# Patient Record
Sex: MALE | Race: WHITE | NOT HISPANIC OR LATINO | Employment: OTHER | URBAN - METROPOLITAN AREA
[De-identification: names, ages, dates, MRNs, and addresses within clinical notes are randomized per-mention and may not be internally consistent; named-entity substitution may affect disease eponyms.]

---

## 2023-12-02 ENCOUNTER — APPOINTMENT (OUTPATIENT)
Dept: RADIOLOGY | Facility: MEDICAL CENTER | Age: 48
DRG: 322 | End: 2023-12-02
Attending: EMERGENCY MEDICINE
Payer: MEDICARE

## 2023-12-02 ENCOUNTER — APPOINTMENT (OUTPATIENT)
Dept: CARDIOLOGY | Facility: MEDICAL CENTER | Age: 48
DRG: 322 | End: 2023-12-02
Attending: NURSE PRACTITIONER
Payer: MEDICARE

## 2023-12-02 ENCOUNTER — TELEPHONE (OUTPATIENT)
Dept: CARDIOLOGY | Facility: MEDICAL CENTER | Age: 48
End: 2023-12-02

## 2023-12-02 ENCOUNTER — HOSPITAL ENCOUNTER (INPATIENT)
Facility: MEDICAL CENTER | Age: 48
LOS: 1 days | DRG: 322 | End: 2023-12-03
Attending: EMERGENCY MEDICINE | Admitting: HOSPITALIST
Payer: MEDICARE

## 2023-12-02 DIAGNOSIS — E11.9 TYPE 2 DIABETES MELLITUS WITHOUT COMPLICATION, WITHOUT LONG-TERM CURRENT USE OF INSULIN (HCC): ICD-10-CM

## 2023-12-02 DIAGNOSIS — I24.9 ACUTE CORONARY SYNDROME (HCC): ICD-10-CM

## 2023-12-02 DIAGNOSIS — I24.9 ACUTE CORONARY SYNDROMES (HCC): ICD-10-CM

## 2023-12-02 PROBLEM — I10 PRIMARY HYPERTENSION: Status: ACTIVE | Noted: 2023-12-02

## 2023-12-02 PROBLEM — E78.2 MIXED HYPERLIPIDEMIA: Status: ACTIVE | Noted: 2023-12-02

## 2023-12-02 PROBLEM — G47.33 OBSTRUCTIVE SLEEP APNEA: Status: ACTIVE | Noted: 2023-12-02

## 2023-12-02 LAB
ACT BLD: 212 SEC (ref 74–137)
ACT BLD: 255 SEC (ref 74–137)
ALBUMIN SERPL BCP-MCNC: 4.1 G/DL (ref 3.2–4.9)
ALBUMIN/GLOB SERPL: 1.5 G/DL
ALP SERPL-CCNC: 58 U/L (ref 30–99)
ALT SERPL-CCNC: 21 U/L (ref 2–50)
ANION GAP SERPL CALC-SCNC: 11 MMOL/L (ref 7–16)
APTT PPP: 31.3 SEC (ref 24.7–36)
AST SERPL-CCNC: 38 U/L (ref 12–45)
BASOPHILS # BLD AUTO: 1.1 % (ref 0–1.8)
BASOPHILS # BLD: 0.09 K/UL (ref 0–0.12)
BILIRUB SERPL-MCNC: 0.5 MG/DL (ref 0.1–1.5)
BUN SERPL-MCNC: 12 MG/DL (ref 8–22)
CALCIUM ALBUM COR SERPL-MCNC: 8.5 MG/DL (ref 8.5–10.5)
CALCIUM SERPL-MCNC: 8.6 MG/DL (ref 8.5–10.5)
CHLORIDE SERPL-SCNC: 102 MMOL/L (ref 96–112)
CO2 SERPL-SCNC: 21 MMOL/L (ref 20–33)
CREAT SERPL-MCNC: 0.71 MG/DL (ref 0.5–1.4)
EKG IMPRESSION: NORMAL
EOSINOPHIL # BLD AUTO: 0.24 K/UL (ref 0–0.51)
EOSINOPHIL NFR BLD: 3 % (ref 0–6.9)
ERYTHROCYTE [DISTWIDTH] IN BLOOD BY AUTOMATED COUNT: 40.7 FL (ref 35.9–50)
EST. AVERAGE GLUCOSE BLD GHB EST-MCNC: 255 MG/DL
GFR SERPLBLD CREATININE-BSD FMLA CKD-EPI: 112 ML/MIN/1.73 M 2
GLOBULIN SER CALC-MCNC: 2.8 G/DL (ref 1.9–3.5)
GLUCOSE BLD STRIP.AUTO-MCNC: 271 MG/DL (ref 65–99)
GLUCOSE BLD STRIP.AUTO-MCNC: 294 MG/DL (ref 65–99)
GLUCOSE SERPL-MCNC: 313 MG/DL (ref 65–99)
HBA1C MFR BLD: 10.5 % (ref 4–5.6)
HCT VFR BLD AUTO: 41.9 % (ref 42–52)
HGB BLD-MCNC: 14.9 G/DL (ref 14–18)
IMM GRANULOCYTES # BLD AUTO: 0.02 K/UL (ref 0–0.11)
IMM GRANULOCYTES NFR BLD AUTO: 0.3 % (ref 0–0.9)
INR PPP: 1.07 (ref 0.87–1.13)
LYMPHOCYTES # BLD AUTO: 2.81 K/UL (ref 1–4.8)
LYMPHOCYTES NFR BLD: 35.4 % (ref 22–41)
MCH RBC QN AUTO: 31.4 PG (ref 27–33)
MCHC RBC AUTO-ENTMCNC: 35.6 G/DL (ref 32.3–36.5)
MCV RBC AUTO: 88.2 FL (ref 81.4–97.8)
MONOCYTES # BLD AUTO: 0.45 K/UL (ref 0–0.85)
MONOCYTES NFR BLD AUTO: 5.7 % (ref 0–13.4)
NEUTROPHILS # BLD AUTO: 4.32 K/UL (ref 1.82–7.42)
NEUTROPHILS NFR BLD: 54.5 % (ref 44–72)
NRBC # BLD AUTO: 0 K/UL
NRBC BLD-RTO: 0 /100 WBC (ref 0–0.2)
PLATELET # BLD AUTO: 231 K/UL (ref 164–446)
PMV BLD AUTO: 10.8 FL (ref 9–12.9)
POTASSIUM SERPL-SCNC: 4 MMOL/L (ref 3.6–5.5)
PROT SERPL-MCNC: 6.9 G/DL (ref 6–8.2)
PROTHROMBIN TIME: 14 SEC (ref 12–14.6)
RBC # BLD AUTO: 4.75 M/UL (ref 4.7–6.1)
SODIUM SERPL-SCNC: 134 MMOL/L (ref 135–145)
TROPONIN T SERPL-MCNC: 198 NG/L (ref 6–19)
TROPONIN T SERPL-MCNC: 397 NG/L (ref 6–19)
TROPONIN T SERPL-MCNC: 7725 NG/L (ref 6–19)
UFH PPP CHRO-ACNC: <0.1 IU/ML
WBC # BLD AUTO: 7.9 K/UL (ref 4.8–10.8)

## 2023-12-02 PROCEDURE — 99152 MOD SED SAME PHYS/QHP 5/>YRS: CPT | Performed by: INTERNAL MEDICINE

## 2023-12-02 PROCEDURE — 700105 HCHG RX REV CODE 258: Performed by: NURSE PRACTITIONER

## 2023-12-02 PROCEDURE — 99291 CRITICAL CARE FIRST HOUR: CPT | Mod: AI | Performed by: HOSPITALIST

## 2023-12-02 PROCEDURE — 93010 ELECTROCARDIOGRAM REPORT: CPT | Mod: 59,76 | Performed by: INTERNAL MEDICINE

## 2023-12-02 PROCEDURE — 85520 HEPARIN ASSAY: CPT

## 2023-12-02 PROCEDURE — 4A023N7 MEASUREMENT OF CARDIAC SAMPLING AND PRESSURE, LEFT HEART, PERCUTANEOUS APPROACH: ICD-10-PCS | Performed by: INTERNAL MEDICINE

## 2023-12-02 PROCEDURE — 027034Z DILATION OF CORONARY ARTERY, ONE ARTERY WITH DRUG-ELUTING INTRALUMINAL DEVICE, PERCUTANEOUS APPROACH: ICD-10-PCS | Performed by: INTERNAL MEDICINE

## 2023-12-02 PROCEDURE — B240ZZ3 ULTRASONOGRAPHY OF SINGLE CORONARY ARTERY, INTRAVASCULAR: ICD-10-PCS | Performed by: INTERNAL MEDICINE

## 2023-12-02 PROCEDURE — 770020 HCHG ROOM/CARE - TELE (206)

## 2023-12-02 PROCEDURE — 93306 TTE W/DOPPLER COMPLETE: CPT | Mod: 26 | Performed by: INTERNAL MEDICINE

## 2023-12-02 PROCEDURE — 84484 ASSAY OF TROPONIN QUANT: CPT

## 2023-12-02 PROCEDURE — 93010 ELECTROCARDIOGRAM REPORT: CPT | Mod: 59 | Performed by: INTERNAL MEDICINE

## 2023-12-02 PROCEDURE — 92978 ENDOLUMINL IVUS OCT C 1ST: CPT | Mod: 26,LC | Performed by: INTERNAL MEDICINE

## 2023-12-02 PROCEDURE — 80053 COMPREHEN METABOLIC PANEL: CPT

## 2023-12-02 PROCEDURE — 36415 COLL VENOUS BLD VENIPUNCTURE: CPT

## 2023-12-02 PROCEDURE — 700101 HCHG RX REV CODE 250

## 2023-12-02 PROCEDURE — A9270 NON-COVERED ITEM OR SERVICE: HCPCS | Performed by: HOSPITALIST

## 2023-12-02 PROCEDURE — 700101 HCHG RX REV CODE 250: Performed by: EMERGENCY MEDICINE

## 2023-12-02 PROCEDURE — B2111ZZ FLUOROSCOPY OF MULTIPLE CORONARY ARTERIES USING LOW OSMOLAR CONTRAST: ICD-10-PCS | Performed by: INTERNAL MEDICINE

## 2023-12-02 PROCEDURE — 96366 THER/PROPH/DIAG IV INF ADDON: CPT

## 2023-12-02 PROCEDURE — 92928 PRQ TCAT PLMT NTRAC ST 1 LES: CPT | Mod: LC | Performed by: INTERNAL MEDICINE

## 2023-12-02 PROCEDURE — 85347 COAGULATION TIME ACTIVATED: CPT

## 2023-12-02 PROCEDURE — A9270 NON-COVERED ITEM OR SERVICE: HCPCS

## 2023-12-02 PROCEDURE — 93306 TTE W/DOPPLER COMPLETE: CPT

## 2023-12-02 PROCEDURE — 99153 MOD SED SAME PHYS/QHP EA: CPT

## 2023-12-02 PROCEDURE — 93005 ELECTROCARDIOGRAM TRACING: CPT | Performed by: INTERNAL MEDICINE

## 2023-12-02 PROCEDURE — 85025 COMPLETE CBC W/AUTO DIFF WBC: CPT

## 2023-12-02 PROCEDURE — 700102 HCHG RX REV CODE 250 W/ 637 OVERRIDE(OP): Performed by: HOSPITALIST

## 2023-12-02 PROCEDURE — 93005 ELECTROCARDIOGRAM TRACING: CPT | Performed by: EMERGENCY MEDICINE

## 2023-12-02 PROCEDURE — 700117 HCHG RX CONTRAST REV CODE 255: Performed by: INTERNAL MEDICINE

## 2023-12-02 PROCEDURE — 96375 TX/PRO/DX INJ NEW DRUG ADDON: CPT

## 2023-12-02 PROCEDURE — 85610 PROTHROMBIN TIME: CPT

## 2023-12-02 PROCEDURE — 700111 HCHG RX REV CODE 636 W/ 250 OVERRIDE (IP): Performed by: EMERGENCY MEDICINE

## 2023-12-02 PROCEDURE — 700105 HCHG RX REV CODE 258: Performed by: INTERNAL MEDICINE

## 2023-12-02 PROCEDURE — 93005 ELECTROCARDIOGRAM TRACING: CPT | Performed by: HOSPITALIST

## 2023-12-02 PROCEDURE — 700111 HCHG RX REV CODE 636 W/ 250 OVERRIDE (IP)

## 2023-12-02 PROCEDURE — 99285 EMERGENCY DEPT VISIT HI MDM: CPT

## 2023-12-02 PROCEDURE — 700111 HCHG RX REV CODE 636 W/ 250 OVERRIDE (IP): Mod: JZ | Performed by: HOSPITALIST

## 2023-12-02 PROCEDURE — 700102 HCHG RX REV CODE 250 W/ 637 OVERRIDE(OP)

## 2023-12-02 PROCEDURE — 82962 GLUCOSE BLOOD TEST: CPT | Mod: 91

## 2023-12-02 PROCEDURE — 96365 THER/PROPH/DIAG IV INF INIT: CPT

## 2023-12-02 PROCEDURE — 99223 1ST HOSP IP/OBS HIGH 75: CPT | Mod: 25 | Performed by: INTERNAL MEDICINE

## 2023-12-02 PROCEDURE — 85730 THROMBOPLASTIN TIME PARTIAL: CPT

## 2023-12-02 PROCEDURE — B2151ZZ FLUOROSCOPY OF LEFT HEART USING LOW OSMOLAR CONTRAST: ICD-10-PCS | Performed by: INTERNAL MEDICINE

## 2023-12-02 PROCEDURE — 71045 X-RAY EXAM CHEST 1 VIEW: CPT

## 2023-12-02 PROCEDURE — 83036 HEMOGLOBIN GLYCOSYLATED A1C: CPT

## 2023-12-02 PROCEDURE — 93458 L HRT ARTERY/VENTRICLE ANGIO: CPT | Mod: 26,59 | Performed by: INTERNAL MEDICINE

## 2023-12-02 RX ORDER — HYDROCODONE BITARTRATE AND ACETAMINOPHEN 10; 325 MG/1; MG/1
1 TABLET ORAL 4 TIMES DAILY
COMMUNITY
End: 2024-02-07

## 2023-12-02 RX ORDER — PRASUGREL 10 MG/1
10 TABLET, FILM COATED ORAL DAILY
Status: DISCONTINUED | OUTPATIENT
Start: 2023-12-03 | End: 2023-12-03 | Stop reason: HOSPADM

## 2023-12-02 RX ORDER — VERAPAMIL HYDROCHLORIDE 2.5 MG/ML
INJECTION, SOLUTION INTRAVENOUS
Status: COMPLETED
Start: 2023-12-02 | End: 2023-12-02

## 2023-12-02 RX ORDER — LIDOCAINE HYDROCHLORIDE 20 MG/ML
INJECTION, SOLUTION INFILTRATION; PERINEURAL
Status: COMPLETED
Start: 2023-12-02 | End: 2023-12-02

## 2023-12-02 RX ORDER — MORPHINE SULFATE 4 MG/ML
2-4 INJECTION INTRAVENOUS
Status: DISCONTINUED | OUTPATIENT
Start: 2023-12-02 | End: 2023-12-03 | Stop reason: HOSPADM

## 2023-12-02 RX ORDER — HEPARIN SODIUM 5000 [USP'U]/100ML
0-30 INJECTION, SOLUTION INTRAVENOUS CONTINUOUS
Status: DISCONTINUED | OUTPATIENT
Start: 2023-12-02 | End: 2023-12-02

## 2023-12-02 RX ORDER — LISINOPRIL 5 MG/1
2.5 TABLET ORAL
Status: DISCONTINUED | OUTPATIENT
Start: 2023-12-02 | End: 2023-12-03 | Stop reason: HOSPADM

## 2023-12-02 RX ORDER — IBUPROFEN 200 MG
800-1000 TABLET ORAL EVERY 8 HOURS PRN
Status: ON HOLD | COMMUNITY
End: 2023-12-03

## 2023-12-02 RX ORDER — HEPARIN SODIUM 1000 [USP'U]/ML
INJECTION, SOLUTION INTRAVENOUS; SUBCUTANEOUS
Status: COMPLETED
Start: 2023-12-02 | End: 2023-12-02

## 2023-12-02 RX ORDER — ASPIRIN 81 MG/1
81 TABLET ORAL DAILY
Status: DISCONTINUED | OUTPATIENT
Start: 2023-12-03 | End: 2023-12-03 | Stop reason: HOSPADM

## 2023-12-02 RX ORDER — ONDANSETRON 4 MG/1
4 TABLET, ORALLY DISINTEGRATING ORAL EVERY 4 HOURS PRN
Status: DISCONTINUED | OUTPATIENT
Start: 2023-12-02 | End: 2023-12-03 | Stop reason: HOSPADM

## 2023-12-02 RX ORDER — CELECOXIB 200 MG/1
200 CAPSULE ORAL 2 TIMES DAILY
Status: ON HOLD | COMMUNITY
End: 2023-12-03

## 2023-12-02 RX ORDER — PRASUGREL 10 MG/1
60 TABLET, FILM COATED ORAL ONCE
Status: DISCONTINUED | OUTPATIENT
Start: 2023-12-02 | End: 2023-12-02

## 2023-12-02 RX ORDER — METOPROLOL TARTRATE 1 MG/ML
5 INJECTION, SOLUTION INTRAVENOUS ONCE
Status: COMPLETED | OUTPATIENT
Start: 2023-12-02 | End: 2023-12-02

## 2023-12-02 RX ORDER — NITROGLYCERIN 0.4 MG/1
0.4 TABLET SUBLINGUAL
Status: DISCONTINUED | OUTPATIENT
Start: 2023-12-02 | End: 2023-12-03 | Stop reason: HOSPADM

## 2023-12-02 RX ORDER — ONDANSETRON 2 MG/ML
4 INJECTION INTRAMUSCULAR; INTRAVENOUS EVERY 4 HOURS PRN
Status: DISCONTINUED | OUTPATIENT
Start: 2023-12-02 | End: 2023-12-03 | Stop reason: HOSPADM

## 2023-12-02 RX ORDER — ASPIRIN 100 %
1 POWDER (GRAM) MISCELLANEOUS ONCE
Status: ON HOLD | COMMUNITY
End: 2023-12-03

## 2023-12-02 RX ORDER — BISACODYL 10 MG
10 SUPPOSITORY, RECTAL RECTAL
Status: DISCONTINUED | OUTPATIENT
Start: 2023-12-02 | End: 2023-12-03 | Stop reason: HOSPADM

## 2023-12-02 RX ORDER — POLYETHYLENE GLYCOL 3350 17 G/17G
1 POWDER, FOR SOLUTION ORAL
Status: DISCONTINUED | OUTPATIENT
Start: 2023-12-02 | End: 2023-12-03 | Stop reason: HOSPADM

## 2023-12-02 RX ORDER — HEPARIN SODIUM 200 [USP'U]/100ML
INJECTION, SOLUTION INTRAVENOUS
Status: COMPLETED
Start: 2023-12-02 | End: 2023-12-02

## 2023-12-02 RX ORDER — PRASUGREL 10 MG/1
TABLET, FILM COATED ORAL
Status: COMPLETED
Start: 2023-12-02 | End: 2023-12-02

## 2023-12-02 RX ORDER — HEPARIN SODIUM 1000 [USP'U]/ML
2000 INJECTION, SOLUTION INTRAVENOUS; SUBCUTANEOUS PRN
Status: DISCONTINUED | OUTPATIENT
Start: 2023-12-02 | End: 2023-12-02

## 2023-12-02 RX ORDER — ACETAMINOPHEN 325 MG/1
650 TABLET ORAL EVERY 6 HOURS PRN
Status: DISCONTINUED | OUTPATIENT
Start: 2023-12-02 | End: 2023-12-03 | Stop reason: HOSPADM

## 2023-12-02 RX ORDER — CARVEDILOL 6.25 MG/1
6.25 TABLET ORAL 2 TIMES DAILY WITH MEALS
Status: DISCONTINUED | OUTPATIENT
Start: 2023-12-02 | End: 2023-12-03 | Stop reason: HOSPADM

## 2023-12-02 RX ORDER — ATORVASTATIN CALCIUM 40 MG/1
40 TABLET, FILM COATED ORAL EVERY EVENING
Status: DISCONTINUED | OUTPATIENT
Start: 2023-12-02 | End: 2023-12-02

## 2023-12-02 RX ORDER — PROMETHAZINE HYDROCHLORIDE 25 MG/1
12.5-25 SUPPOSITORY RECTAL EVERY 4 HOURS PRN
Status: DISCONTINUED | OUTPATIENT
Start: 2023-12-02 | End: 2023-12-03 | Stop reason: HOSPADM

## 2023-12-02 RX ORDER — ATORVASTATIN CALCIUM 40 MG/1
40 TABLET, FILM COATED ORAL ONCE
Status: COMPLETED | OUTPATIENT
Start: 2023-12-02 | End: 2023-12-02

## 2023-12-02 RX ORDER — ASPIRIN 81 MG/1
162 TABLET ORAL ONCE
Status: COMPLETED | OUTPATIENT
Start: 2023-12-02 | End: 2023-12-02

## 2023-12-02 RX ORDER — AMOXICILLIN 250 MG
2 CAPSULE ORAL 2 TIMES DAILY
Status: DISCONTINUED | OUTPATIENT
Start: 2023-12-02 | End: 2023-12-03 | Stop reason: HOSPADM

## 2023-12-02 RX ORDER — PROMETHAZINE HYDROCHLORIDE 25 MG/1
12.5-25 TABLET ORAL EVERY 4 HOURS PRN
Status: DISCONTINUED | OUTPATIENT
Start: 2023-12-02 | End: 2023-12-03 | Stop reason: HOSPADM

## 2023-12-02 RX ORDER — MIDAZOLAM HYDROCHLORIDE 1 MG/ML
INJECTION INTRAMUSCULAR; INTRAVENOUS
Status: COMPLETED
Start: 2023-12-02 | End: 2023-12-02

## 2023-12-02 RX ORDER — ATORVASTATIN CALCIUM 40 MG/1
40 TABLET, FILM COATED ORAL EVERY EVENING
Status: DISCONTINUED | OUTPATIENT
Start: 2023-12-03 | End: 2023-12-02

## 2023-12-02 RX ORDER — PROCHLORPERAZINE EDISYLATE 5 MG/ML
5-10 INJECTION INTRAMUSCULAR; INTRAVENOUS EVERY 4 HOURS PRN
Status: DISCONTINUED | OUTPATIENT
Start: 2023-12-02 | End: 2023-12-03 | Stop reason: HOSPADM

## 2023-12-02 RX ORDER — SODIUM CHLORIDE 9 MG/ML
3 INJECTION, SOLUTION INTRAVENOUS CONTINUOUS
Status: ACTIVE | OUTPATIENT
Start: 2023-12-02 | End: 2023-12-02

## 2023-12-02 RX ORDER — ATORVASTATIN CALCIUM 80 MG/1
80 TABLET, FILM COATED ORAL EVERY EVENING
Status: DISCONTINUED | OUTPATIENT
Start: 2023-12-03 | End: 2023-12-03 | Stop reason: HOSPADM

## 2023-12-02 RX ORDER — SODIUM CHLORIDE 9 MG/ML
INJECTION, SOLUTION INTRAVENOUS CONTINUOUS
Status: DISCONTINUED | OUTPATIENT
Start: 2023-12-02 | End: 2023-12-02

## 2023-12-02 RX ADMIN — INSULIN HUMAN 3 UNITS: 100 INJECTION, SOLUTION PARENTERAL at 16:39

## 2023-12-02 RX ADMIN — HEPARIN SODIUM: 1000 INJECTION, SOLUTION INTRAVENOUS; SUBCUTANEOUS at 12:26

## 2023-12-02 RX ADMIN — NITROGLYCERIN 10 ML: 20 INJECTION INTRAVENOUS at 12:25

## 2023-12-02 RX ADMIN — HEPARIN SODIUM 2000 UNITS: 200 INJECTION, SOLUTION INTRAVENOUS at 12:25

## 2023-12-02 RX ADMIN — IOHEXOL 119 ML: 350 INJECTION, SOLUTION INTRAVENOUS at 13:10

## 2023-12-02 RX ADMIN — MIDAZOLAM HYDROCHLORIDE 2 MG: 1 INJECTION, SOLUTION INTRAMUSCULAR; INTRAVENOUS at 12:45

## 2023-12-02 RX ADMIN — METOPROLOL TARTRATE 5 MG: 5 INJECTION INTRAVENOUS at 07:34

## 2023-12-02 RX ADMIN — SODIUM CHLORIDE 3 ML/KG/HR: 9 INJECTION, SOLUTION INTRAVENOUS at 14:26

## 2023-12-02 RX ADMIN — FENTANYL CITRATE 100 MCG: 50 INJECTION, SOLUTION INTRAMUSCULAR; INTRAVENOUS at 12:45

## 2023-12-02 RX ADMIN — MIDAZOLAM HYDROCHLORIDE 1 MG: 1 INJECTION, SOLUTION INTRAMUSCULAR; INTRAVENOUS at 13:13

## 2023-12-02 RX ADMIN — LISINOPRIL 2.5 MG: 5 TABLET ORAL at 09:22

## 2023-12-02 RX ADMIN — HEPARIN SODIUM 12 UNITS/KG/HR: 5000 INJECTION, SOLUTION INTRAVENOUS at 06:11

## 2023-12-02 RX ADMIN — HEPARIN SODIUM: 1000 INJECTION, SOLUTION INTRAVENOUS; SUBCUTANEOUS at 13:11

## 2023-12-02 RX ADMIN — LIDOCAINE HYDROCHLORIDE: 20 INJECTION, SOLUTION INFILTRATION; PERINEURAL at 12:25

## 2023-12-02 RX ADMIN — ATORVASTATIN CALCIUM 40 MG: 40 TABLET, FILM COATED ORAL at 09:22

## 2023-12-02 RX ADMIN — SODIUM CHLORIDE: 9 INJECTION, SOLUTION INTRAVENOUS at 09:21

## 2023-12-02 RX ADMIN — PRASUGREL 60 MG: 10 TABLET, FILM COATED ORAL at 13:12

## 2023-12-02 RX ADMIN — CARVEDILOL 6.25 MG: 6.25 TABLET, FILM COATED ORAL at 09:22

## 2023-12-02 RX ADMIN — MORPHINE SULFATE 4 MG: 4 INJECTION, SOLUTION INTRAMUSCULAR; INTRAVENOUS at 15:21

## 2023-12-02 RX ADMIN — ASPIRIN 162 MG: 81 TABLET, COATED ORAL at 09:22

## 2023-12-02 RX ADMIN — CARVEDILOL 6.25 MG: 6.25 TABLET, FILM COATED ORAL at 16:45

## 2023-12-02 RX ADMIN — VERAPAMIL HYDROCHLORIDE 5 MG: 2.5 INJECTION, SOLUTION INTRAVENOUS at 12:25

## 2023-12-02 RX ADMIN — ACETAMINOPHEN 650 MG: 325 TABLET, FILM COATED ORAL at 15:21

## 2023-12-02 RX ADMIN — INSULIN HUMAN 3 UNITS: 100 INJECTION, SOLUTION PARENTERAL at 20:22

## 2023-12-02 RX ADMIN — MORPHINE SULFATE 4 MG: 4 INJECTION, SOLUTION INTRAMUSCULAR; INTRAVENOUS at 13:45

## 2023-12-02 ASSESSMENT — FIBROSIS 4 INDEX: FIB4 SCORE: 1.79

## 2023-12-02 ASSESSMENT — ENCOUNTER SYMPTOMS
CHILLS: 0
STRIDOR: 0
FEVER: 0
FOCAL WEAKNESS: 0
ABDOMINAL PAIN: 0
NERVOUS/ANXIOUS: 0
EYE REDNESS: 0
FLANK PAIN: 0
MYALGIAS: 0
VOMITING: 0
SHORTNESS OF BREATH: 0
EYE DISCHARGE: 0
BRUISES/BLEEDS EASILY: 0
COUGH: 0

## 2023-12-02 ASSESSMENT — PATIENT HEALTH QUESTIONNAIRE - PHQ9
1. LITTLE INTEREST OR PLEASURE IN DOING THINGS: NOT AT ALL
2. FEELING DOWN, DEPRESSED, IRRITABLE, OR HOPELESS: NOT AT ALL
SUM OF ALL RESPONSES TO PHQ9 QUESTIONS 1 AND 2: 0

## 2023-12-02 ASSESSMENT — LIFESTYLE VARIABLES
HAVE PEOPLE ANNOYED YOU BY CRITICIZING YOUR DRINKING: NO
TOTAL SCORE: 0
EVER FELT BAD OR GUILTY ABOUT YOUR DRINKING: NO
EVER HAD A DRINK FIRST THING IN THE MORNING TO STEADY YOUR NERVES TO GET RID OF A HANGOVER: NO
CONSUMPTION TOTAL: NEGATIVE
AVERAGE NUMBER OF DAYS PER WEEK YOU HAVE A DRINK CONTAINING ALCOHOL: 0
ON A TYPICAL DAY WHEN YOU DRINK ALCOHOL HOW MANY DRINKS DO YOU HAVE: 0
ALCOHOL_USE: NO
HOW MANY TIMES IN THE PAST YEAR HAVE YOU HAD 5 OR MORE DRINKS IN A DAY: 0
TOTAL SCORE: 0
HAVE YOU EVER FELT YOU SHOULD CUT DOWN ON YOUR DRINKING: NO
TOTAL SCORE: 0

## 2023-12-02 ASSESSMENT — COGNITIVE AND FUNCTIONAL STATUS - GENERAL
MOBILITY SCORE: 23
SUGGESTED CMS G CODE MODIFIER DAILY ACTIVITY: CH
DAILY ACTIVITIY SCORE: 24
SUGGESTED CMS G CODE MODIFIER MOBILITY: CI
CLIMB 3 TO 5 STEPS WITH RAILING: A LITTLE

## 2023-12-02 ASSESSMENT — PAIN DESCRIPTION - PAIN TYPE
TYPE: ACUTE PAIN
TYPE: ACUTE PAIN

## 2023-12-02 NOTE — THERAPY
Physical Therapy Contact Note    Patient Name: Hal Vilchis  Age:  48 y.o., Sex:  male  Medical Record #: [id number]  Today's Date: 12/2/2023    PT consult received for cardiac rehab, pending cath; will follow up post intervention when appropriate for accurate education on phase I cardiac rehab/intensity.     Marilyn CERVANTES, PT,  [phone]

## 2023-12-02 NOTE — ED NOTES
Pt transported to T8 with RN on zoll on full O2 tank with chart and all belongings in stable condition

## 2023-12-02 NOTE — PROGRESS NOTES
Cardiology Follow Up Progress Note    Date of Service  12/3/23    Attending Physician  Joshua Vázquez M.D.    Chief Complaint     NSTEMI    HPI  Hal Vilchis is a 48 y.o. male admitted 12/2/2023 from [facility] with chest pain.    PMH: Tobacco use, poorly controlled type 2 diabetes, hypertension, ERICK, mixed hyperlipidemia, BMI 34  Interim Events    No overnight cardiac events  Telemetry-SR  Underwent successful PTCA/PCI distal LCx, residual disease in RCA  No recurrent chest pain  On DAPT  Poorly controlled diabetes, A1c 10  , target less than 70  Tobacco use, discussed cessation  Requires cardiac rehab        Review of Systems  Review of Systems   Respiratory:  Negative for apnea, cough, choking, chest tightness, shortness of breath, wheezing and stridor.        Vital signs in last 24 hours  Temp:  [36.6 °C (97.8 °F)-36.8 °C (98.2 °F)] 36.7 °C (98.1 °F)  Pulse:  [71-90] 75  Resp:  [18-20] 18  BP: (133-179)/() 133/102  SpO2:  [93 %-97 %] 94 %    Physical Exam  Physical Exam  Cardiovascular:      Rate and Rhythm: Normal rate and regular rhythm.      Pulses: Normal pulses.   Pulmonary:      Effort: Pulmonary effort is normal.   Skin:     General: Skin is warm.   Neurological:      Mental Status: He is alert. Mental status is at baseline.   Psychiatric:         Mood and Affect: Mood normal.         Lab Review  Lab Results   Component Value Date/Time    WBC 7.9 12/02/2023 05:58 AM    RBC 4.75 12/02/2023 05:58 AM    HEMOGLOBIN 14.9 12/02/2023 05:58 AM    HEMATOCRIT 41.9 (L) 12/02/2023 05:58 AM    MCV 88.2 12/02/2023 05:58 AM    MCH 31.4 12/02/2023 05:58 AM    MCHC 35.6 12/02/2023 05:58 AM    MPV 10.8 12/02/2023 05:58 AM      Lab Results   Component Value Date/Time    SODIUM 134 (L) 12/02/2023 05:58 AM    POTASSIUM 4.0 12/02/2023 05:58 AM    CHLORIDE 102 12/02/2023 05:58 AM    CO2 21 12/02/2023 05:58 AM    GLUCOSE 313 (H) 12/02/2023 05:58 AM    BUN 12 12/02/2023 05:58 AM    CREATININE 0.71  "12/02/2023 05:58 AM      Lab Results   Component Value Date/Time    ASTSGOT 38 12/02/2023 05:58 AM    ALTSGPT 21 12/02/2023 05:58 AM     Lab Results   Component Value Date/Time    TROPONINT 397 (H) 12/02/2023 07:43 AM       No results for input(s): \"NTPROBNP\" in the last 72 hours.        Assessment/Plan    NSTEMI  -Status post PTCA/PCI x1 distal LCx 12/2/2023.  -Residual right posterior descending artery has focal 90% stenosis in the distal portion.  -DAPT with aspirin 81 and Effient 10 mg along with atorvastatin 80, lisinopril 2.5 and carvedilol 6.25 twice daily.  -LVEF 60%  -LVEDP 16 mmHg  -      Lifelong tobacco use  -Discussed cessation in length.    Type 2 diabetes  -A1c 10.5  -Reports intolerant to metformin with GI upset  -Currently on SSI      Hypertension  -Well-controlled on lisinopril and carvedilol      BMI 34  -Therapeutic lifestyle changes    Referral to cardiac rehab  Close follow-up in cardiology office, will arrange  Cardiology will sign off    I personally spent a total of 15 minutes which includes face-to-face time and non-face-to-face time spent on preparing to see the patient, reviewing hospital notes and tests, obtaining history from the patient, performing a medically appropriate exam, counseling and educating the patient, ordering medications/tests/procedures/referrals as clinically indicated, and documenting information in the electronic medical record.     Thank you for allowing me to participate in the care of this patient.  I will continue to follow this patient    Please contact me with any questions.    RICHARD Erazo.   Cardiologist, [facility]  [phone]        "

## 2023-12-02 NOTE — ED PROVIDER NOTES
"ED Provider Note    CHIEF COMPLAINT  Chief Complaint   Patient presents with    Chest Pain     Transfer from [place].  Elevated troponin; ST depression at outside facility.        EXTERNAL RECORDS REVIEWED  Other reviewed the patient's transfer records    HPI/ROS    OUTSIDE HISTORIAN(S):  EMS provided signout in the room at the bedside    Hal Vilchis is a 48 y.o. male who presents with chest pain.  The patient states that chest pain started yesterday evening.  He states last about 15 minutes.  He states that it was a pressure with associated difficulty with breathing.  Subsequently the pain resolved however on 9:30 PM the pain came back and was much more persistent and severe.  He did have associated nausea as well as diaphoresis and dyspnea.  He presented to [facility] and was found to have a non-ST segment elevation myocardial infarction.  The patient does abuse tobacco products and he also has hypertension, dyslipidemia, and type 2 diabetes as risk factors.  He does not have any known prior cardiac history.  The patient did receive aspirin as well as nitroglycerin at their facility and he was transferred to the emergency department at [facility] on a heparin drip.  He states he does feel better at this time.  Otherwise he has not had any pain or swelling to his lower extremities.    PAST MEDICAL HISTORY       SURGICAL HISTORY  patient denies any surgical history    FAMILY HISTORY  No family history on file.    SOCIAL HISTORY  Social History     Tobacco Use    Smoking status: Not on file    Smokeless tobacco: Not on file   Substance and Sexual Activity    Alcohol use: Not on file    Drug use: Not on file    Sexual activity: Not on file       CURRENT MEDICATIONS  Home Medications    **Home medications have not yet been reviewed for this encounter**         ALLERGIES  No Known Allergies    PHYSICAL EXAM  VITAL SIGNS: Ht 1.854 m (6' 1\")   Wt 113 kg (250 lb)   BMI 32.98 kg/m²    In " general the patient does not appear toxic    HEENT unremarkable    Pulmonary the patient's lungs are symmetrically diminished throughout    Cardiovascular S1-S2 with a regular rate and rhythm    GI abdomen soft    Skin no rashes, pallor, no jaundice    Extremities no distal edema    Neurologic examination is grossly intact    DIAGNOSTIC STUDIES  Results for orders placed or performed during the hospital encounter of 12/02/23   CBC with Differential   Result Value Ref Range    WBC 7.9 4.8 - 10.8 K/uL    RBC 4.75 4.70 - 6.10 M/uL    Hemoglobin 14.9 14.0 - 18.0 g/dL    Hematocrit 41.9 (L) 42.0 - 52.0 %    MCV 88.2 81.4 - 97.8 fL    MCH 31.4 27.0 - 33.0 pg    MCHC 35.6 32.3 - 36.5 g/dL    RDW 40.7 35.9 - 50.0 fL    Platelet Count 231 164 - 446 K/uL    MPV 10.8 9.0 - 12.9 fL    Neutrophils-Polys 54.50 44.00 - 72.00 %    Lymphocytes 35.40 22.00 - 41.00 %    Monocytes 5.70 0.00 - 13.40 %    Eosinophils 3.00 0.00 - 6.90 %    Basophils 1.10 0.00 - 1.80 %    Immature Granulocytes 0.30 0.00 - 0.90 %    Nucleated RBC 0.00 0.00 - 0.20 /100 WBC    Neutrophils (Absolute) 4.32 1.82 - 7.42 K/uL    Lymphs (Absolute) 2.81 1.00 - 4.80 K/uL    Monos (Absolute) 0.45 0.00 - 0.85 K/uL    Eos (Absolute) 0.24 0.00 - 0.51 K/uL    Baso (Absolute) 0.09 0.00 - 0.12 K/uL    Immature Granulocytes (abs) 0.02 0.00 - 0.11 K/uL    NRBC (Absolute) 0.00 K/uL   Complete Metabolic Panel (CMP)   Result Value Ref Range    Sodium 134 (L) 135 - 145 mmol/L    Potassium 4.0 3.6 - 5.5 mmol/L    Chloride 102 96 - 112 mmol/L    Co2 21 20 - 33 mmol/L    Anion Gap 11.0 7.0 - 16.0    Glucose 313 (H) 65 - 99 mg/dL    Bun 12 8 - 22 mg/dL    Creatinine 0.71 0.50 - 1.40 mg/dL    Calcium 8.6 8.5 - 10.5 mg/dL    Correct Calcium 8.5 8.5 - 10.5 mg/dL    AST(SGOT) 38 12 - 45 U/L    ALT(SGPT) 21 2 - 50 U/L    Alkaline Phosphatase 58 30 - 99 U/L    Total Bilirubin 0.5 0.1 - 1.5 mg/dL    Albumin 4.1 3.2 - 4.9 g/dL    Total Protein 6.9 6.0 - 8.2 g/dL    Globulin 2.8 1.9 - 3.5  g/dL    A-G Ratio 1.5 g/dL   Troponins NOW   Result Value Ref Range    Troponin T 198 (H) 6 - 19 ng/L   aPTT   Result Value Ref Range    APTT 31.3 24.7 - 36.0 sec   Prothrombin Time   Result Value Ref Range    PT 14.0 12.0 - 14.6 sec    INR 1.07 0.87 - 1.13   Heparin Xa (Unfractionated)   Result Value Ref Range    Heparin Xa (UFH) <0.10 IU/mL   ESTIMATED GFR   Result Value Ref Range    GFR (CKD-EPI) 112 >60 mL/min/1.73 m 2   EKG   Result Value Ref Range    Report       [facility] Emergency Dept.    Test Date:  2023  Pt Name:    SUMMER NICHOLS              Department: ER  MRN:        [id number]                      Room:        09  Gender:     Male                         Technician: [id number]  :        1975                   Requested By:ER TRIAGE PROTOCOL  Order #:    [id number]                    Reading MD: FERNIE ORTIZ MD    Measurements  Intervals                                Axis  Rate:       87                           P:          32  DE:         156                          QRS:        44  QRSD:       107                          T:          101  QT:         383  QTc:        461    Interpretive Statements  Twelve-lead EKG shows a normal sinus rhythm with a ventricular rate 87, the  patient does have ST segment elevation in lead aVF as well as V5 and V6 but  is not greater than 1 box to meet criteria for an ST segment elevation  myocardial infarction.  Otherwise normal intervals and normal QRS.  Carin cisneros Signed On 2023 06:00:45 PST by FERNIE ORTIZ MD       EKG  I have independently interpreted this EKG  Please see my interpretation above    RADIOLOGY  DX-CHEST-PORTABLE (1 VIEW)   Final Result      LEFT lower lobe opacity probably atelectasis or pneumonia            COURSE & MEDICAL DECISION MAKING    This a 48-year-old gentleman who presents the emergency department with a non-ST segment elevation myocardial infarction per [facility].  I  did review his CBC that did not show any evidence of leukocytosis nor anemia.  His coagulation parameters were reviewed as well and are normal.  From a metabolic standpoint does not have any evidence of renal or hepatic insufficiency.  The patient's troponin was elevated at 0.15.  I will repeat the troponin at this time as well as the EKG.  The patient does not have any ongoing chest pain which is reassuring.    The patient's troponin does come back elevated at 198 consistent with an acute coronary syndrome.  I did continue his heparin and as mentioned above the patient did receive aspirin as well as nitroglycerin prior to transfer.  His blood pressure is starting to increase and the patient will receive Lopressor for blood pressure control.  I was able to review his EKG from the transferring facility and the EKG at that time did show significant ST elevation that met criteria for acute ST elevation myocardial infarction.  There is ST segments have come down and the patient is resting comfortably at the time of admission.     FINAL DIAGNOSIS  Acute coronary syndrome  Critical Care time 30 minutes    Disposition  The patient will be admitted in guarded condition         Electronically signed by: Stephan Atkinson M.D., 12/2/2023 5:51 AM

## 2023-12-02 NOTE — ASSESSMENT & PLAN NOTE
Body mass index is 32.98 kg/m².  I explained that there is increase in mortality and morbidity associated with excess weight.  There is increased risk of diabetes, hypertension, heart disease, cerebrovascular accidents, sleep apnea and cancer.    I placed a referral to cardiac rehab  I encouraged the patient to lose weight by reducing caloric intake, I encouraged low-carb diet in addition to aim for 120-150 minutes of exercise a week when cleared from cardiology standpoint.

## 2023-12-02 NOTE — PROGRESS NOTES
4 Eyes Skin Assessment Completed by VINNIE Hussein and CAROLYN Pink.    Head WDL  Ears WDL  Nose WDL  Mouth WDL  Neck WDL  Breast/Chest WDL  Shoulder Blades WDL  Spine WDL  (R) Arm/Elbow/Hand Redness and Blanching scattered scabs  (L) Arm/Elbow/Hand Redness and Blanchingscattered scabs  Abdomen WDL  Groin WDL  Scrotum/Coccyx/Buttocks Redness and Blanching  (R) Leg Scab  (L) Leg Scab  (R) Heel/Foot/Toe Boggy  (L) Heel/Foot/Toe Boggy          Devices In Places Tele Box, Blood Pressure Cuff, and Pulse Ox      Interventions In Place Pillows    Possible Skin Injury No    Pictures Uploaded Into Epic N/A  Wound Consult Placed N/A  RN Wound Prevention Protocol Ordered No

## 2023-12-02 NOTE — H&P
Hospital Medicine History & Physical Note    Date of Service  12/2/2023    Primary Care Physician  Pcp Pt States None    Consultants  Cardiology, Dr Gonzalez      Code Status  Full Code    Chief Complaint  Chief Complaint   Patient presents with    Chest Pain     Transfer from [place].  Elevated troponin; ST depression at outside facility.      History of Presenting Illness  Hal Vilchis is a 48 y.o. male with a past medical history of diabetes, primary hypertension, hyperlipidemia who has stopped taking his medications recently presented 12/2/2023 with chest pain.  Pain started yesterday in the retrosternal area.  Pain lasts about 15 minutes.  It is described as having a pressure-like/heaviness. He did have nausea and diaphoresis in addition to shortness of breath.  He initially presented to [facility] and was found to have a non-ST elevation myocardial infarction before transferring to our facility.     I discussed the plan of care with emergency department physician, and the patient.    Review of Systems  Review of Systems   Constitutional:  Negative for chills and fever.   Eyes:  Negative for discharge and redness.   Respiratory:  Negative for cough, shortness of breath and stridor.    Cardiovascular:  Positive for chest pain. Negative for leg swelling.   Gastrointestinal:  Negative for abdominal pain and vomiting.   Genitourinary:  Negative for flank pain.   Musculoskeletal:  Negative for myalgias.   Skin: Negative.    Neurological:  Negative for focal weakness.   Endo/Heme/Allergies:  Does not bruise/bleed easily.   Psychiatric/Behavioral:  The patient is not nervous/anxious.      Past Medical History  Primary hypertension, diabetes, and hyperlipidemia    Surgical History   has no past surgical history on file.     Family History  family history is not on file.     Social History  Denies excessive drinking    Allergies  No Known Allergies    Medications  None     Physical Exam  Temp:  [36.5  °C (97.7 °F)-36.8 °C (98.2 °F)] 36.7 °C (98.1 °F)  Pulse:  [64-90] 64  Resp:  [16-20] 16  BP: (129-179)/() 129/82  SpO2:  [93 %-97 %] 94 %  Blood Pressure: (!) 160/94   Temperature: 36.6 °C (97.8 °F)   Pulse: 78   Respiration: 18   Pulse Oximetry: 94 %     Physical Exam  Constitutional:       General: He is not in acute distress.     Appearance: He is not ill-appearing or diaphoretic.   HENT:      Head: Atraumatic.      Right Ear: External ear normal.      Left Ear: External ear normal.      Nose: No congestion or rhinorrhea.      Mouth/Throat:      Mouth: Mucous membranes are moist.   Eyes:      General: No scleral icterus.        Right eye: No discharge.         Left eye: No discharge.      Pupils: Pupils are equal, round, and reactive to light.   Cardiovascular:      Rate and Rhythm: Normal rate and regular rhythm.   Pulmonary:      Comments: Requiring 2 L of oxygen to achieve adequate saturation.  Is able to speak in full sentences.  Abdominal:      General: There is no distension.   Musculoskeletal:      Cervical back: Neck supple. No rigidity. No muscular tenderness.      Right lower leg: No edema.      Left lower leg: No edema.   Skin:     Coloration: Skin is not jaundiced or pale.   Neurological:      Mental Status: He is alert and oriented to person, place, and time.      Coordination: Coordination normal.   Psychiatric:         Mood and Affect: Mood normal.         Behavior: Behavior normal.       Laboratory:  Recent Labs     12/02/23  0558   WBC 7.9   RBC 4.75   HEMOGLOBIN 14.9   HEMATOCRIT 41.9*   MCV 88.2   MCH 31.4   MCHC 35.6   RDW 40.7   PLATELETCT 231   MPV 10.8     Recent Labs     12/02/23  0558   SODIUM 134*   POTASSIUM 4.0   CHLORIDE 102   CO2 21   GLUCOSE 313*   BUN 12   CREATININE 0.71   CALCIUM 8.6     Recent Labs     12/02/23  0558   ALTSGPT 21   ASTSGOT 38   ALKPHOSPHAT 58   TBILIRUBIN 0.5   GLUCOSE 313*     Recent Labs     12/02/23  0558   APTT 31.3   INR 1.07     No results for  "input(s): \"NTPROBNP\" in the last 72 hours.      Recent Labs     12/02/23  0558 12/02/23  0743   TROPONINT 198* 397*     Imaging:  DX-CHEST-PORTABLE (1 VIEW)   Final Result      LEFT lower lobe opacity probably atelectasis or pneumonia      CL-LEFT HEART CATHETERIZATION WITH POSSIBLE INTERVENTION    (Results Pending)   EC-ECHOCARDIOGRAM COMPLETE W/O CONT    (Results Pending)     I personally reviewed patient EKG shows sinus rhythm with a rate of 87, there is ST segment elevation in lead II, III and aVF.  There is no ST depression.  There is T wave inversion in lead aVL.  QTc is 383.    Assessment/Plan:  Justification for Admission Status  I anticipate this patient will require at least two midnights for appropriate medical management, necessitating inpatient admission because the patient has acute coronary syndrome    Patient will need a Telemetry bed on CARDIOLOGY service .  The need is secondary as the patient has acute coronary syndrome    * Acute coronary syndromes (HCC)- (present on admission)  Assessment & Plan  EKG shows, sinus rhythm with a rate of 87, there is ST segment elevation in lead II, III and aVF.  There is no ST depression.  There is T wave inversion in lead aVL.  QTc is 383.  Initial troponin is elevated and trending up   I will place on continuous cardiac monitoring.  I will start aspirin, statin, heparin drip, carvedilol and lisinopril.  Cardiology [Dr Gonzalez] have been consulted by the emergency department physician to consider cath study.    Type 2 diabetes mellitus without complication, without long-term current use of insulin (HCC)- (present on admission)  Assessment & Plan  With hyperglycemia  I will start short acting insulin for now  I will order Accu-Checks, hypoglycemia protocol  Adjust according to blood sugars trend.     Primary hypertension- (present on admission)  Assessment & Plan  I will start carvedilol and lisinopril with hold parameters    BMI 32.0-32.9,adult- (present on " admission)  Assessment & Plan  Body mass index is 32.98 kg/m².  I explained that there is increase in mortality and morbidity associated with excess weight.  There is increased risk of diabetes, hypertension, heart disease, cerebrovascular accidents, sleep apnea and cancer.    I placed a referral to cardiac rehab  I encouraged the patient to lose weight by reducing caloric intake, I encouraged low-carb diet in addition to aim for 120-150 minutes of exercise a week when cleared from cardiology standpoint.    Mixed hyperlipidemia- (present on admission)  Assessment & Plan  Cardiac diet.  I will start atorvastatin    Obstructive sleep apnea- (present on admission)  Assessment & Plan  Continuous pulse oximetry.  Oxygen as needed.  Encourage incentive spirometer      VTE prophylaxis: SCDs/TEDs and therapeutic anticoagulation with heparin drip    Patient is critically ill.   The patient continues to have: Cardiac ischemia and rising troponin  The vital organ system that is affected is the: Cardiovascular system  If untreated there is a high chance of deterioration into: Worsening cardiac ischemia, shock, multiorgan failure, and even death  The critical care that I am providing today is: Initiating titratable heparin drip, antiplatelets, ACE inhibitor, high intensity statin and beta-blockers  The critical that has been undertaken is medically complex.   There has been no overlap in critical care time.   Critical Care Time not including procedures: 76 minutes

## 2023-12-02 NOTE — ED NOTES
Med Rec completed per patient   Allergies reviewed  No ORAL antibiotics in last 30 days    Patient states that he is suppose to be on blood pressure and diabetes medications but he has not taken these in a few years

## 2023-12-02 NOTE — CONSULTS
Cardiology Initial Consult Note    Reason for Consult:  Asked by Dr Joshua Vázquez M.D. to see this patient with chest pain, NSTEMI  Patient's PCP: Pcp Pt States None    CC:   Chief Complaint   Patient presents with    Chest Pain     Transfer from [place].  Elevated troponin; ST depression at outside facility.        HPI:    48 year old man with past medical history of tobacco use, diabetes mellitus, hypertension who presented to [facility] with chest pain.    Symptoms started last night around 9-9:30 pm when he developed chest pain with associated diaphoresis and nausea. While at [facility] ED, troponins were minimally elevated x3. He was hypertensive with SBP >160 mmHg. EKG showed ST segment changes in inferior lead but did not meet criteria for STEMI. Case discussed with on call cardiologist who recommended transfer to [facility] for NSTEMI. Subsequently transferred to [facility] for ongoing cardiac care.    While in [facility] ED, he remained hemodynamically stable without chest pain. Troponin checked and elevated to 198 subsequently increased to 397. EKG repeated and shows sinus rhythm, and nonspecific ST-T changes. No STEMI. Remains on heparin gtt. Patient denies any major bleeding issues.  No hemoptysis, hematemesis or melena.    Medications / Drug list prior to admission:  No current facility-administered medications on file prior to encounter.     Current Outpatient Medications on File Prior to Encounter   Medication Sig Dispense Refill    HYDROcodone/acetaminophen (NORCO)  MG Tab Take 1 Tablet by mouth 4 times a day.      ibuprofen (MOTRIN) 200 MG Tab Take 800-1,000 mg by mouth every 8 hours as needed. Indications: Pain      celecoxib (CELEBREX) 200 MG Cap Take 200 mg by mouth 2 times a day.      Chlorpheniramine-DM (COUGH & COLD PO) Take 1 Dose by mouth every 6 hours as needed (Cough).      Aspirin Powder Take 1 Dose by mouth one time.         Current list of administered Medications:    Current  Facility-Administered Medications:     heparin infusion 25,000 units in 500 mL 0.45% NACL, 0-30 Units/kg/hr (Adjusted), Intravenous, Continuous, Stephan Atkinson M.D., Last Rate: 22.3 mL/hr at 12/02/23 0848, 12 Units/kg/hr at 12/02/23 0848    heparin injection 2,000 Units, 2,000 Units, Intravenous, PRN, Stephan Atkinson M.D.    acetaminophen (Tylenol) tablet 650 mg, 650 mg, Oral, Q6HRS PRN, Joshua Vázquez M.D.    senna-docusate (Pericolace Or Senokot S) 8.6-50 MG per tablet 2 Tablet, 2 Tablet, Oral, BID **AND** polyethylene glycol/lytes (Miralax) Packet 1 Packet, 1 Packet, Oral, QDAY PRN **AND** magnesium hydroxide (Milk Of Magnesia) suspension 30 mL, 30 mL, Oral, QDAY PRN **AND** bisacodyl (Dulcolax) suppository 10 mg, 10 mg, Rectal, QDAY PRN, Joshua Vázquez M.D.    [START ON 12/3/2023] aspirin EC tablet 81 mg, 81 mg, Oral, DAILY, Joshua Vázquez M.D.    carvedilol (Coreg) tablet 6.25 mg, 6.25 mg, Oral, BID WITH MEALS, Joshua Vázquez M.D.    lisinopril (Prinivil) tablet 2.5 mg, 2.5 mg, Oral, Q DAY, Joshua Vázquez M.D.    nitroglycerin (Nitrostat) tablet 0.4 mg, 0.4 mg, Sublingual, Q5 MIN PRN, Joshua Vázquez M.D.    morphine 4 MG/ML injection 2-4 mg, 2-4 mg, Intravenous, Q5 MIN PRN, Joshua Vázquez M.D.    ondansetron (Zofran) syringe/vial injection 4 mg, 4 mg, Intravenous, Q4HRS PRN, Joshua Vázquez M.D.    ondansetron (Zofran ODT) dispertab 4 mg, 4 mg, Oral, Q4HRS PRN, Joshua Vázquez M.D.    promethazine (Phenergan) tablet 12.5-25 mg, 12.5-25 mg, Oral, Q4HRS PRN, Joshua Vázquez M.D.    promethazine (Phenergan) suppository 12.5-25 mg, 12.5-25 mg, Rectal, Q4HRS PRN, Joshua Vázquez M.D.    prochlorperazine (Compazine) injection 5-10 mg, 5-10 mg, Intravenous, Q4HRS PRN, Joshua Vázquez M.D.    insulin regular (HumuLIN R,NovoLIN R) injection, 1-6 Units, Subcutaneous, 4X/DAY ACHS **AND** POC blood glucose manual result, , , Q AC AND BEDTIME(S) **AND** NOTIFY MD and PharmD, , , Once **AND**  "Administer 20 grams of glucose (approximately 8 ounces of fruit juice) every 15 minutes PRN FSBG less than 70 mg/dL, , , PRN **AND** dextrose 10 % BOLUS 25 g, 25 g, Intravenous, Q15 MIN PRN, Joshua Vázquez M.D.    [START ON 12/3/2023] atorvastatin (Lipitor) tablet 40 mg, 40 mg, Oral, Q EVENING, Joshua Vázquez M.D.    atorvastatin (Lipitor) tablet 40 mg, 40 mg, Oral, Once, Joshua Vázquez M.D.    aspirin EC tablet 162 mg, 162 mg, Oral, Once, Joshua Vázquez M.D.    NS infusion, , Intravenous, Continuous, Katelynn Blanca A.P.NTyrell    lidocaine (Xylocaine) 1 % injection 0.5 mL, 0.5 mL, Intradermal, Once PRN, Katelynn Blanca A.P.NTyrell    History reviewed. No pertinent past medical history.    History reviewed. No pertinent surgical history.    History reviewed. No pertinent family history.  Patient family history was personally reviewed, no pertinent family history to current presentation    Social History     Tobacco Use    Smoking status: Every Day     Current packs/day: 1.50     Average packs/day: 1.5 packs/day for 41.9 years (62.9 ttl pk-yrs)     Types: Cigarettes     Start date: 1982    Smokeless tobacco: Never       ALLERGIES:  No Known Allergies    Review of systems:  A complete review of symptoms was reviewed with the patient. This is reviewed in H&P and PMH. ALL OTHERS reviewed and negative    Physical exam:  Patient Vitals for the past 24 hrs:   BP Temp Temp src Pulse Resp SpO2 Height Weight   12/02/23 0843 (!) 165/101 36.8 °C (98.2 °F) Temporal 75 19 95 % -- 115 kg (254 lb 6.6 oz)   12/02/23 0803 (!) 158/102 -- -- 72 -- 97 % -- --   12/02/23 0740 (!) 162/103 -- -- 71 20 95 % -- --   12/02/23 0732 (!) 179/101 -- -- 78 20 96 % -- --   12/02/23 0703 (!) 160/94 -- -- 78 -- 94 % -- --   12/02/23 0548 (!) 136/95 36.6 °C (97.8 °F) Temporal 90 18 93 % -- --   12/02/23 0546 -- -- -- -- -- -- 1.854 m (6' 1\") 113 kg (250 lb)     General: Not in acute distress  EYES: No jaundice  HEENT: OP clear   Neck:  No carotid " bruits, No JVD appreciated  CVS:  RRR, Normal S1, S2. No murmurs, rubs or gallops  Resp: Normal respiratory effort, lungs CTA bilaterally. No rales or rhonchi  Abdomen: Soft, non-distended, non-tender to palpation, no guarding or rigidity  Skin: No obvious rashes, no cyanosis  Neurological: Alert and oriented x3, moves all extremities, no focal neurologic deficits  Psychiatric: Appropriate affect  Extremities:   Extremities warm, 2+ bilateral radial pulses.  2+ bilateral dp pulses, no lower extremity edema bilaterally      Data:  Laboratory studies personally reviewed by me:  Recent Results (from the past 24 hour(s))   EKG    Collection Time: 23  5:54 AM   Result Value Ref Range    Report       [facility] Emergency Dept[facility]    Test Date:  2023  Pt Name:    SUMMER NICHOLS              Department: ER  MRN:        [id number]                      Room:       [facility]  Gender:     Male                         Technician: [id number]  :        1975                   Requested By:ER TRIAGE PROTOCOL  Order #:    [id number]                    Reading MD: FERNIE ORTIZ MD    Measurements  Intervals                                Axis  Rate:       87                           P:          32  GA:         156                          QRS:        44  QRSD:       107                          T:          101  QT:         383  QTc:        461    Interpretive Statements  Twelve-lead EKG shows a normal sinus rhythm with a ventricular rate 87, the  patient does have ST segment elevation in lead aVF as well as V5 and V6 but  is not greater than 1 box to meet criteria for an ST segment elevation  myocardial infarction.  Otherwise normal intervals and normal QRS.  Carin cisneros Signed On 2023 06:00:45 PST by FERNIE ORTIZ MD     CBC with Differential    Collection Time: 23  5:58 AM   Result Value Ref Range    WBC 7.9 4.8 - 10.8 K/uL    RBC 4.75 4.70 - 6.10 M/uL    Hemoglobin 14.9 14.0 -  18.0 g/dL    Hematocrit 41.9 (L) 42.0 - 52.0 %    MCV 88.2 81.4 - 97.8 fL    MCH 31.4 27.0 - 33.0 pg    MCHC 35.6 32.3 - 36.5 g/dL    RDW 40.7 35.9 - 50.0 fL    Platelet Count 231 164 - 446 K/uL    MPV 10.8 9.0 - 12.9 fL    Neutrophils-Polys 54.50 44.00 - 72.00 %    Lymphocytes 35.40 22.00 - 41.00 %    Monocytes 5.70 0.00 - 13.40 %    Eosinophils 3.00 0.00 - 6.90 %    Basophils 1.10 0.00 - 1.80 %    Immature Granulocytes 0.30 0.00 - 0.90 %    Nucleated RBC 0.00 0.00 - 0.20 /100 WBC    Neutrophils (Absolute) 4.32 1.82 - 7.42 K/uL    Lymphs (Absolute) 2.81 1.00 - 4.80 K/uL    Monos (Absolute) 0.45 0.00 - 0.85 K/uL    Eos (Absolute) 0.24 0.00 - 0.51 K/uL    Baso (Absolute) 0.09 0.00 - 0.12 K/uL    Immature Granulocytes (abs) 0.02 0.00 - 0.11 K/uL    NRBC (Absolute) 0.00 K/uL   Complete Metabolic Panel (CMP)    Collection Time: 12/02/23  5:58 AM   Result Value Ref Range    Sodium 134 (L) 135 - 145 mmol/L    Potassium 4.0 3.6 - 5.5 mmol/L    Chloride 102 96 - 112 mmol/L    Co2 21 20 - 33 mmol/L    Anion Gap 11.0 7.0 - 16.0    Glucose 313 (H) 65 - 99 mg/dL    Bun 12 8 - 22 mg/dL    Creatinine 0.71 0.50 - 1.40 mg/dL    Calcium 8.6 8.5 - 10.5 mg/dL    Correct Calcium 8.5 8.5 - 10.5 mg/dL    AST(SGOT) 38 12 - 45 U/L    ALT(SGPT) 21 2 - 50 U/L    Alkaline Phosphatase 58 30 - 99 U/L    Total Bilirubin 0.5 0.1 - 1.5 mg/dL    Albumin 4.1 3.2 - 4.9 g/dL    Total Protein 6.9 6.0 - 8.2 g/dL    Globulin 2.8 1.9 - 3.5 g/dL    A-G Ratio 1.5 g/dL   Troponins NOW    Collection Time: 12/02/23  5:58 AM   Result Value Ref Range    Troponin T 198 (H) 6 - 19 ng/L   aPTT    Collection Time: 12/02/23  5:58 AM   Result Value Ref Range    APTT 31.3 24.7 - 36.0 sec   Prothrombin Time    Collection Time: 12/02/23  5:58 AM   Result Value Ref Range    PT 14.0 12.0 - 14.6 sec    INR 1.07 0.87 - 1.13   Heparin Xa (Unfractionated)    Collection Time: 12/02/23  5:58 AM   Result Value Ref Range    Heparin Xa (UFH) <0.10 IU/mL   ESTIMATED GFR     Collection Time: 12/02/23  5:58 AM   Result Value Ref Range    GFR (CKD-EPI) 112 >60 mL/min/1.73 m 2   Troponins in two (2) hours    Collection Time: 12/02/23  7:43 AM   Result Value Ref Range    Troponin T 397 (H) 6 - 19 ng/L       Imaging:  DX-CHEST-PORTABLE (1 VIEW)   Final Result      LEFT lower lobe opacity probably atelectasis or pneumonia      CL-LEFT HEART CATHETERIZATION WITH POSSIBLE INTERVENTION    (Results Pending)   EC-ECHOCARDIOGRAM COMPLETE W/O CONT    (Results Pending)         EKG tracings personally reviewed by me sinus rhythm, nonspecific ST-T wave changes      All pertinent features of laboratory and imaging reviewed including primary images where applicable      Principal Problem:    Acute coronary syndromes (HCC) (POA: Yes)  Active Problems:    Obstructive sleep apnea (POA: Yes)    Primary hypertension (POA: Yes)    Type 2 diabetes mellitus without complication, without long-term current use of insulin (HCC) (POA: Yes)    Mixed hyperlipidemia (POA: Yes)    BMI 32.0-32.9,adult (POA: Yes)  Resolved Problems:    * No resolved hospital problems. *      Assessment / Plan:  48 year old man with past medical history of tobacco use, diabetes mellitus, hypertension who presented to [facility] with chest pain found to have NSTEMI.    Non-ST elevation myocardial infarction  Lifelong tobacco use  Diabetes mellitus  Hypertension    Plan:  Mr. Vilchis presents with acute onset chest pain and subsequently found to have elevated troponin consistent with non-ST elevation myocardial infarction.  Initial troponin at [facility] was 198 and subsequently increased to 397.  Has been appropriately started on heparin drip for anticoagulation.  He is currently chest pain-free and hemodynamically stable.  Discussed plan for early invasive strategy given NSTEMI.  Patient is in agreement.  Plan for cardiac cath today. Keep n.p.o.  Continue heparin gtt  Continue atorvastatin 40 mg daily and aspirin 81 mg daily  Check  echocardiogram  Check lipid panel  Needs good blood pressure control.  .  Started on Coreg 6.25 mg twice daily.  Uptitrate as needed for goal BP <130/80.  Cardiology will continue to follow    The risks, benefits, and alternatives to coronary angiography with IV sedation were discussed in great detail. Specific risks mentioned include bleeding, infection, kidney damage, allergic reaction, cardiac perforation with possible tamponade requiring nicole-cardiocentesis or possible open heart surgery. In addition, we discussed that 10% of patients will experience small to moderate bruising at the side of the arterial puncture. Lastly the risks of heart attack, stroke, and death were discussed; the risks of major complications such as heart attack or stroke caused by the angiogram is less than 1%; the risk of death is approximately 1 in 1000. The patient verbalized understanding of these potential complications and wishes to proceed with this procedure.        It is my pleasure to participate in the care of Mr. Vilchis.  Please do not hesitate to contact me with questions or concerns.    Immanuel Gonzalez MD  Cardiologist, [facility]    12/2/2023    Please note that this dictation was created using voice recognition software. I have made every reasonable attempt to correct obvious errors, but it is possible there are errors of grammar and possibly content that I did not discover before finalizing the note.

## 2023-12-02 NOTE — ED TRIAGE NOTES
Chief Complaint   Patient presents with    Chest Pain     Transfer from [place].  Elevated troponin; ST depression at outside facility.      Pt given ASA, heparin, nitro SL at outside facility.    Pt given 1 mg Versed en route for anxiety due to turbulence.

## 2023-12-02 NOTE — ASSESSMENT & PLAN NOTE
With hyperglycemia  I will start short acting insulin for now  I will order Accu-Checks, hypoglycemia protocol  Adjust according to blood sugars trend.

## 2023-12-02 NOTE — ED NOTES
"Bedside report obtained from [facility]. Pt A&Ox4, in nad. Pt reports no chest pain at this time, states it is \"sore\". Instructed pt to inform RN if pain returns. Pt on all monitoring including cardiac monitor. On 2L NC connected to wall. On heparin gtt @ 12units/hr. Pt denies any needs at this time, updated on POC, call light within reach.  "

## 2023-12-02 NOTE — ASSESSMENT & PLAN NOTE
EKG shows, sinus rhythm with a rate of 87, there is ST segment elevation in lead II, III and aVF.  There is no ST depression.  There is T wave inversion in lead aVL.  QTc is 383.  Initial troponin is elevated and trending up   I will place on continuous cardiac monitoring.  I will start aspirin, statin, heparin drip, carvedilol and lisinopril.  Cardiology [Dr Gonzalez] have been consulted by the emergency department physician to consider cath study.

## 2023-12-02 NOTE — PROCEDURES
Cardiac Catheterization and Percutaneous Intervention Procedure Report    2023    Referring MD:     Indication for procedure: Non-ST elevation MI    Procedures:  Right and left coronary arteriograms  Left heart catheterization and Left ventriculogram  IVUS guided angioplasty and placement of a 3.5 by 16mm Synergy drug-eluting stent in distal  left circumflex coronary artery.      Coronary arteriograms:  Left main: normal  Left anterior descendin% stenosis in the proximal portion.  1 major diagonal branch is noted.  Distal LAD has diffuse luminal irregularities.  Left circumflex: First marginal branch comes off in the proximal portion, it has focal 60% stenosis in the proximal portion.  Distal circumflex artery is occluded, distally supplies large second marginal branch.  Right coronary: Diffuse 50% stenosis in the mid to distal portion.  Right posterior descending artery has focal 90% stenosis in the distal portion, small vessel at this location., dominant    Left Heart Catheterization:  Left Ventriculogram: ejection fraction 60%  Left Ventricular EDP: 16 mm Hg   Aortic Valve Gradient: No significant AV gradient noted    Procedure details:  Hal Vilchis was brought to the cardiac catheterization lab where the right wrist was prepped and draped in the usual manner for cardiac catheterization.  Timeout was performed.  The area was anesthetized with lidocaine and a 5/6 FR sheath was inserted into the right radial artery without difficulty. A #3.5 left Umesh catheter was advanced to the ostia of the Left coronary artery and arteriograms were recorded.   A #4 right Umesh catheter was advanced to the ostia of the right coronary artery and arteriograms were recorded. Aortic valve was crossed using #4 right Umesh catheter left heart catheterization and left ventriculogram were performed.  Patient underwent percutaneous coronary revascularization as outlined below.  At the completion of the  "case the sheath was removed and hemostasis achieved utilizing a radial compression band .  Patient was pain-free and hemodynamically stable at the completion of the case.  There were no apparent complications.    Interventional Procedure:     Given the patient's clinical presentation and coronary anatomy, PCI was indicated and we proceeded with the intervention as detailed below.    Indication for PCI:  NSTE- ACD    Pre: 100%, 12 mm length, CATHERINE 0 flow  Post: 0%, CATHERINE 3 flow    Lesion complexity  High  Severe calcification No  Bifurcation  No    Guide catheter: EBU 3.5 was advanced to the ostia of the left coronary artery.    Guide wire: A 0.014\" mm  Runthrough was advanced into the artery and crossed the lesion.    Mid circumflex artery is diffusely diseased long segment, distally occluded.  Distal lesion treated with angioplasty using 2.0 x 12 mm balloon, a 3.5 x 16 mm Synergy drug-eluting stent was placed in the distal circumflex artery.  IVUS was performed which showed diffusely diseased vessel but good stent expansion, apposition.  Mid circumflex artery treated with 3.5 x 20 mm NC balloon, great result with angioplasty, due to long segment stenosis, additional stents not placed.      Anticoagulant: Heparin  Antiplatelet: Prasugrel  EBL <25 cc  Complications: none  Specimens: none  Contrast: 120 cc  Fluorotime : see cath lab flowsheet      Sedation: I supervised moderate sedation over a trained independent observer.    Sedation start time: 12:35  End time: 13:11        Electronically signed by   Tyree Bennett M.D., RAMOS  Interventional cardiologist  12/2/2023  1:23 PM          "

## 2023-12-03 ENCOUNTER — PHARMACY VISIT (OUTPATIENT)
Dept: PHARMACY | Facility: MEDICAL CENTER | Age: 48
End: 2023-12-03
Payer: MEDICARE

## 2023-12-03 VITALS
SYSTOLIC BLOOD PRESSURE: 121 MMHG | OXYGEN SATURATION: 95 % | TEMPERATURE: 97.7 F | HEART RATE: 92 BPM | WEIGHT: 254.41 LBS | HEIGHT: 73 IN | BODY MASS INDEX: 33.72 KG/M2 | RESPIRATION RATE: 16 BRPM | DIASTOLIC BLOOD PRESSURE: 79 MMHG

## 2023-12-03 LAB
ALBUMIN SERPL BCP-MCNC: 4 G/DL (ref 3.2–4.9)
ALBUMIN/GLOB SERPL: 1.4 G/DL
ALP SERPL-CCNC: 56 U/L (ref 30–99)
ALT SERPL-CCNC: 42 U/L (ref 2–50)
ANION GAP SERPL CALC-SCNC: 13 MMOL/L (ref 7–16)
AST SERPL-CCNC: 173 U/L (ref 12–45)
BILIRUB SERPL-MCNC: 0.6 MG/DL (ref 0.1–1.5)
BUN SERPL-MCNC: 9 MG/DL (ref 8–22)
CALCIUM ALBUM COR SERPL-MCNC: 8.6 MG/DL (ref 8.5–10.5)
CALCIUM SERPL-MCNC: 8.6 MG/DL (ref 8.5–10.5)
CHLORIDE SERPL-SCNC: 103 MMOL/L (ref 96–112)
CHOLEST SERPL-MCNC: 228 MG/DL (ref 100–199)
CO2 SERPL-SCNC: 20 MMOL/L (ref 20–33)
CREAT SERPL-MCNC: 0.7 MG/DL (ref 0.5–1.4)
ERYTHROCYTE [DISTWIDTH] IN BLOOD BY AUTOMATED COUNT: 41.5 FL (ref 35.9–50)
GFR SERPLBLD CREATININE-BSD FMLA CKD-EPI: 112 ML/MIN/1.73 M 2
GLOBULIN SER CALC-MCNC: 2.9 G/DL (ref 1.9–3.5)
GLUCOSE BLD STRIP.AUTO-MCNC: 275 MG/DL (ref 65–99)
GLUCOSE BLD STRIP.AUTO-MCNC: 314 MG/DL (ref 65–99)
GLUCOSE SERPL-MCNC: 231 MG/DL (ref 65–99)
HCT VFR BLD AUTO: 43.7 % (ref 42–52)
HDLC SERPL-MCNC: 24 MG/DL
HGB BLD-MCNC: 15.2 G/DL (ref 14–18)
LDLC SERPL CALC-MCNC: 131 MG/DL
MAGNESIUM SERPL-MCNC: 2.1 MG/DL (ref 1.5–2.5)
MCH RBC QN AUTO: 31.3 PG (ref 27–33)
MCHC RBC AUTO-ENTMCNC: 34.8 G/DL (ref 32.3–36.5)
MCV RBC AUTO: 90.1 FL (ref 81.4–97.8)
PLATELET # BLD AUTO: 237 K/UL (ref 164–446)
PMV BLD AUTO: 10.4 FL (ref 9–12.9)
POTASSIUM SERPL-SCNC: 3.8 MMOL/L (ref 3.6–5.5)
PROT SERPL-MCNC: 6.9 G/DL (ref 6–8.2)
RBC # BLD AUTO: 4.85 M/UL (ref 4.7–6.1)
SODIUM SERPL-SCNC: 136 MMOL/L (ref 135–145)
TRIGL SERPL-MCNC: 366 MG/DL (ref 0–149)
WBC # BLD AUTO: 9.8 K/UL (ref 4.8–10.8)

## 2023-12-03 PROCEDURE — 83735 ASSAY OF MAGNESIUM: CPT

## 2023-12-03 PROCEDURE — RXMED WILLOW AMBULATORY MEDICATION CHARGE: Performed by: NURSE PRACTITIONER

## 2023-12-03 PROCEDURE — 99232 SBSQ HOSP IP/OBS MODERATE 35: CPT | Performed by: INTERNAL MEDICINE

## 2023-12-03 PROCEDURE — 82962 GLUCOSE BLOOD TEST: CPT | Mod: 91

## 2023-12-03 PROCEDURE — A9270 NON-COVERED ITEM OR SERVICE: HCPCS | Performed by: INTERNAL MEDICINE

## 2023-12-03 PROCEDURE — 36415 COLL VENOUS BLD VENIPUNCTURE: CPT

## 2023-12-03 PROCEDURE — 700102 HCHG RX REV CODE 250 W/ 637 OVERRIDE(OP)

## 2023-12-03 PROCEDURE — 700102 HCHG RX REV CODE 250 W/ 637 OVERRIDE(OP): Performed by: INTERNAL MEDICINE

## 2023-12-03 PROCEDURE — A9270 NON-COVERED ITEM OR SERVICE: HCPCS | Performed by: HOSPITALIST

## 2023-12-03 PROCEDURE — 700102 HCHG RX REV CODE 250 W/ 637 OVERRIDE(OP): Performed by: HOSPITALIST

## 2023-12-03 PROCEDURE — 80061 LIPID PANEL: CPT

## 2023-12-03 PROCEDURE — A9270 NON-COVERED ITEM OR SERVICE: HCPCS

## 2023-12-03 PROCEDURE — RXMED WILLOW AMBULATORY MEDICATION CHARGE: Performed by: STUDENT IN AN ORGANIZED HEALTH CARE EDUCATION/TRAINING PROGRAM

## 2023-12-03 PROCEDURE — 80053 COMPREHEN METABOLIC PANEL: CPT

## 2023-12-03 PROCEDURE — 85027 COMPLETE CBC AUTOMATED: CPT

## 2023-12-03 RX ORDER — OXYCODONE HYDROCHLORIDE 5 MG/1
5 TABLET ORAL
Status: DISCONTINUED | OUTPATIENT
Start: 2023-12-03 | End: 2023-12-03 | Stop reason: HOSPADM

## 2023-12-03 RX ORDER — ATORVASTATIN CALCIUM 80 MG/1
80 TABLET, FILM COATED ORAL EVERY EVENING
Qty: 90 TABLET | Refills: 3 | Status: SHIPPED | OUTPATIENT
Start: 2023-12-03 | End: 2023-12-19 | Stop reason: SDUPTHER

## 2023-12-03 RX ORDER — HYDROMORPHONE HYDROCHLORIDE 1 MG/ML
0.5 INJECTION, SOLUTION INTRAMUSCULAR; INTRAVENOUS; SUBCUTANEOUS
Status: DISCONTINUED | OUTPATIENT
Start: 2023-12-03 | End: 2023-12-03 | Stop reason: HOSPADM

## 2023-12-03 RX ORDER — CARVEDILOL 6.25 MG/1
6.25 TABLET ORAL 2 TIMES DAILY WITH MEALS
Qty: 60 TABLET | Refills: 3 | Status: SHIPPED | OUTPATIENT
Start: 2023-12-03 | End: 2023-12-19 | Stop reason: SDUPTHER

## 2023-12-03 RX ORDER — ASPIRIN 81 MG/1
81 TABLET ORAL DAILY
Qty: 100 TABLET | Refills: 3 | Status: SHIPPED | OUTPATIENT
Start: 2023-12-04 | End: 2023-12-19 | Stop reason: SDUPTHER

## 2023-12-03 RX ORDER — INSULIN GLARGINE 100 [IU]/ML
10 INJECTION, SOLUTION SUBCUTANEOUS EVERY EVENING
Qty: 15 ML | Refills: 1 | Status: ACTIVE | OUTPATIENT
Start: 2023-12-03 | End: 2024-01-02 | Stop reason: SDUPTHER

## 2023-12-03 RX ORDER — LISINOPRIL 2.5 MG/1
2.5 TABLET ORAL DAILY
Qty: 30 TABLET | Refills: 3 | Status: SHIPPED | OUTPATIENT
Start: 2023-12-04 | End: 2023-12-19 | Stop reason: SDUPTHER

## 2023-12-03 RX ORDER — OXYCODONE HYDROCHLORIDE 10 MG/1
10 TABLET ORAL
Status: DISCONTINUED | OUTPATIENT
Start: 2023-12-03 | End: 2023-12-03 | Stop reason: HOSPADM

## 2023-12-03 RX ORDER — PEN NEEDLE, DIABETIC 30 GX3/16"
NEEDLE, DISPOSABLE MISCELLANEOUS
Qty: 100 EACH | Refills: 3 | Status: SHIPPED | OUTPATIENT
Start: 2023-12-03

## 2023-12-03 RX ORDER — PRASUGREL 10 MG/1
10 TABLET, FILM COATED ORAL DAILY
Qty: 90 TABLET | Refills: 3 | Status: SHIPPED | OUTPATIENT
Start: 2023-12-04 | End: 2023-12-19 | Stop reason: SDUPTHER

## 2023-12-03 RX ADMIN — LISINOPRIL 2.5 MG: 5 TABLET ORAL at 06:44

## 2023-12-03 RX ADMIN — OXYCODONE HYDROCHLORIDE 10 MG: 10 TABLET ORAL at 03:29

## 2023-12-03 RX ADMIN — CARVEDILOL 6.25 MG: 6.25 TABLET, FILM COATED ORAL at 06:43

## 2023-12-03 RX ADMIN — ASPIRIN 81 MG: 81 TABLET, COATED ORAL at 06:44

## 2023-12-03 RX ADMIN — INSULIN HUMAN 4 UNITS: 100 INJECTION, SOLUTION PARENTERAL at 08:44

## 2023-12-03 RX ADMIN — PRASUGREL 10 MG: 10 TABLET, FILM COATED ORAL at 06:45

## 2023-12-03 RX ADMIN — INSULIN HUMAN 3 UNITS: 100 INJECTION, SOLUTION PARENTERAL at 12:12

## 2023-12-03 ASSESSMENT — ENCOUNTER SYMPTOMS
APNEA: 0
CHEST TIGHTNESS: 0
SHORTNESS OF BREATH: 0
CHOKING: 0
STRIDOR: 0
WHEEZING: 0
COUGH: 0

## 2023-12-03 ASSESSMENT — PAIN DESCRIPTION - PAIN TYPE
TYPE: CHRONIC PAIN
TYPE: CHRONIC PAIN

## 2023-12-03 NOTE — DISCHARGE INSTRUCTIONS
Diagnosis:  Acute Coronary Syndrome (ACS) is a diagnosis that encompasses cardiac-related chest pain and heart attack. ACS occurs when the blood flow to the heart muscle is severely reduced or cut off completely due to a slow process called atherosclerosis.  Atherosclerosis is a disease in which the coronary arteries become narrow from a buildup of fat, cholesterol, and other substances that combine to form plaque. If the plaque breaks, a blood clot will form and block the blood flow to the heart muscle. This lack of blood flow can cause damage or death to the heart muscle which is called a heart attack or Myocardial Infarction (MI). There are two different types of MIs:  ST Elevation Myocardial Infarction or STEMI (the most severe type of heart attack) and Non-ST Elevation Myocardial Infarction or NSTEMI.    Treatment Plan:  Cardiac Diet  - Low fat, low salt, low cholesterol   Cardiac Rehab  - Your doctor has ordered you a referral to [facility].  Call [phone] to schedule an appointment.  Attend my follow-up appointment with my Cardiologist.  Take my medications as prescribed by my doctor  Exercise daily  Lower my bad cholesterol and raise my good cholesterol and Control my diabetes    Medications:  Certain medications are used to treat ACS.  Remember to always take medications as prescribed and never stop talking medications unless told by your doctor.    You have been prescribed the following medicatons:    Aspirin - Aspirin is used as a blood thinning medication and you will require this medication indefinitely.  Anti-platelet/blood thinner - Your Anti-platelet/Blood thinning medication is called prasugrel, and is used in combination with aspirin to prevent clots from forming in your heart and/or around your stent.  Your doctor will determine how long you need to be on this medicine.  Beta-Blocker - Beta-Blocker carvedilol is used to lower blood pressure and heart rate, and/or helps your heart heal after a  heart attack.  Statin - Statin atorvastatin is used to lower cholesterol.  Angiotensin Converting Enzyme (ACE) Inhibitor - Angiotensin Converting Enzyme Inhibitor lisinopril is used to lower blood pressure and treat heart failure.

## 2023-12-03 NOTE — PROGRESS NOTES
Assumed care of pt. Bedside report received from RN. Pt was updated on plan of care. AOx4. Pt pain level 3/10, declines interventions states that this is chronic right shoulder pain, denies any chest pain. Pt is on telemetry, SR 79. Call light, phone and personal belongings in reach. Bed alarm on and working properly, bed in lowest position, and locked. TR band was removed from R radial area at start of shift with dayshift RN. R radial area is CDI and soft.        Bronchoscopy Procedure Note    Location: CVICU    Date of Operation : 8/4/2023    Estimated Blood Loss: Minimal     Drains: None     Complications: None    Indications and History:  The patient is a 78 y.o. male who is s/p OSH cardiac arrest. Successfully extubated. Unfortunately the patient has progressively become more agitated and has copious amounts of secretions. Urgently intubated this AM. Bronchoscopy performed shortly thereafter. The risks, benefits, complications, treatment options and expected outcomes were discussed with the spouse. Description of Procedure: The patient was seen in the CVICU, paralyzed and sedated. The patient was identified as Chestine Bold and the procedure verified as Flexible Fiberoptic Bronchoscopy. A Time Out was held and the above information confirmed. After the induction of topical nasopharyngeal anesthesia, the patient was positioned supine and the bronchoscope was passed through the ETT adapter. The scope was then passed into the trachea. Careful inspection of the tracheal lumen was accomplished. The scope was sequentially passed into the left main and then left upper and lower bronchi and segmental bronchi. The scope was then withdrawn and advanced into the right main bronchus and then into the RUL, RML, and RLL bronchi and segmental bronchi. Copious amounts of blood-tinged secretions were removed from throughout the right and left bronchial trees. Anatomy was observed to be intact, but mucosa was noted to be very irritated. Overall, the patient tolerated the procedure. Remained hemodynamically stable. TICO Castillo MD, [facility]  CVICU Intensivist

## 2023-12-03 NOTE — CARE PLAN
Problem: Knowledge Deficit - Standard  Goal: Patient and family/care givers will demonstrate understanding of plan of care, disease process/condition, diagnostic tests and medications  Outcome: Progressing     Problem: Pain - Standard  Goal: Alleviation of pain or a reduction in pain to the patient’s comfort goal  Outcome: Progressing   The patient is Stable - Low risk of patient condition declining or worsening    Shift Goals  Clinical Goals: Remain free of falls, pain control  Patient Goals: Pain control  Family Goals: Get better    Progress made toward(s) clinical / shift goals:      Pt educated on plan of care, pt verbalizes understanding, reinforcement needed. Pt educated on pain/anxiety scales, alleviating factors, pain/anxiety medications, and side effects, and need for pain/anxiety reassessment, pt pain/anxiety controlled at this time, reinforcement needed. Pt educated on the need to reposition frequently and remain dry to avoid skin breakdown, reinforcement needed, no further skin breakdown during shift. Fall risk education provided to pt, pt educated about the need to call for assistance while attempting to ambulate, reinforcement needed, pt remains free of falls this shift.

## 2023-12-03 NOTE — PROGRESS NOTES
Called  and left voicemail to have cardiology brandon and PCP brandon scheduled for next two weeks.

## 2023-12-03 NOTE — PROGRESS NOTES
12 hour chart check    Rhythm: SR  Rate: 72-82  Ectopy: (R) PAC, (R) PVC  Measurements: .16/.09/.39

## 2023-12-03 NOTE — DIETARY
"Nutrition services: Day 1 of admit.  Hal Vilchis is a 48 y.o. male with admitting DX of Acute coronary syndromes     Consult received for wt loss of 24-33 lbs in 3 months. No poor PO noted per admit screen. Consult received for cardiac diet education. Met with pt at bedside. Pt appeared adequately nourished. Pt and wife stated that pt tends to lose weight around the same time every year, but usually gains it back. No wt hx in chart to assess/trend. Pt also has hx of diabetes. Discussed consistent CHO meal pattern with heart healthy diet modifications. Pt declined consistent CHO education handouts. RD provided heart healthy education handout. Pt demonstrated appropriate readiness and adequate evidence of learning. RD able to answer all questions to patient's satisfaction.     Assessment:  Height: 185.4 cm (6' 1\")  Weight: 115 kg (254 lb 6.6 oz)  Body mass index is 33.57 kg/m²., BMI classification: obese class I  Diet/Intake: Cardiac; PO % for one meal thus far    Evaluation:   Transferred from outside facility with chest pain.  PMH: Diabetes, hyperlipidemia.  A1c 10.5 (12/2)    Malnutrition Risk: Does not meet criteria per ASPEN guidelines at this time.    Recommendations/Plan:  Encourage intake of meals.  Document intake of all meals as % taken in ADLs to provide interdisciplinary communication across all shifts.   Monitor weight.  Nutrition rep will continue to see patient for ongoing meal and snack preferences.     RD will follow per dept guidelines.      "

## 2023-12-04 NOTE — PROGRESS NOTES
Patient being discharged. Pt educated on discharge instructions and new prescriptions, verbalized understanding. Follow up appointment to be made with PCP and cardiology. PIV removed, monitor checked in. Patient going home via wife

## 2023-12-04 NOTE — PROGRESS NOTES
Cardiology Progress Note:    Feliberto Bolanos M.D.  Date & Time note created:    12/3/2023   9:41 PM     Referring MD:  Dr. Vázquez    Patient ID:   Name:             Hal Vilchis   YOB: 1975  Age:                 48 y.o.  male   MRN:               [id number]                                                             Chief Complaint / Reason for consult:  NSTEMEI.    History of Present Illness:    Hal Vilchis is a 48 y.o. male with a past medical history of diabetes, primary hypertension, hyperlipidemia who has stopped taking his medications recently presented 12/2/2023 with chest pain.  He was transferred to [facility] from [facility] for NSTEMI. He underwent successful stent placement in the distal Lx.     Overnight events:  No acute events overnight.      Review of Systems:      Constitutional: Denies fevers, Denies weight changes  Eyes: Denies changes in vision, no eye pain  Ears/Nose/Throat/Mouth: Denies nasal congestion or sore throat   Cardiovascular: no chest pain, no palpitations   Respiratory: no shortness of breath , Denies cough  Gastrointestinal/Hepatic: Denies abdominal pain, nausea, vomiting, diarrhea, constipation or GI bleeding   Genitourinary: Denies dysuria or frequency  Musculoskeletal/Rheum: Denies  joint pain and swelling   Skin: Denies rash  Neurological: Denies headache, confusion, memory loss or focal weakness/parasthesias  Psychiatric: denies mood disorder   Endocrine: Mamie thyroid problems  Heme/Oncology/Lymph Nodes: Denies enlarged lymph nodes, denies brusing or known bleeding disorder  All other systems were reviewed and are negative (AMA/CMS criteria)                Past Medical History:   History reviewed. No pertinent past medical history.  Active Hospital Problems    Diagnosis     Acute coronary syndromes (HCC) [I24.9]     Obstructive sleep apnea [G47.33]     Primary hypertension [I10]     Type 2 diabetes mellitus without complication, without  long-term current use of insulin (HCC) [E11.9]     Mixed hyperlipidemia [E78.2]     BMI 32.0-32.9,adult [Z68.32]        Past Surgical History:  History reviewed. No pertinent surgical history.    Hospital Medications:  No current facility-administered medications for this encounter.    Current Outpatient Medications:     [START ON 12/4/2023] prasugrel (EFFIENT) 10 MG Tab, Take 1 Tablet by mouth every day., Disp: 90 Tablet, Rfl: 3    atorvastatin (LIPITOR) 80 MG tablet, Take 1 Tablet by mouth every evening., Disp: 90 Tablet, Rfl: 3    [START ON 12/4/2023] aspirin 81 MG EC tablet, Take 1 Tablet by mouth every day., Disp: 100 Tablet, Rfl: 3    carvedilol (COREG) 6.25 MG Tab, Take 1 Tablet by mouth 2 times a day with meals., Disp: 60 Tablet, Rfl: 3    [START ON 12/4/2023] lisinopril (PRINIVIL) 2.5 MG Tab, Take 1 Tablet by mouth every day., Disp: 30 Tablet, Rfl: 3    metFORMIN (GLUCOPHAGE) 500 MG Tab, Take 1 Tablet by mouth 2 times a day with meals., Disp: 60 Tablet, Rfl: 1    insulin glargine (LANTUS SOLOSTAR) 100 UNIT/ML Solution Pen-injector injection, Inject 10 Units under the skin every evening., Disp: 15 mL, Rfl: 1    Insulin Pen Needle (PEN NEEDLES) 32G X 4 MM Misc, USE WITH INSULIN PEN, Disp: 100 Each, Rfl: 3    HYDROcodone/acetaminophen (NORCO)  MG Tab, Take 1 Tablet by mouth 4 times a day., Disp: , Rfl:     Chlorpheniramine-DM (COUGH & COLD PO), Take 1 Dose by mouth every 6 hours as needed (Cough)., Disp: , Rfl:     Current Outpatient Medications:  No medications prior to admission.       Medication Allergy:  No Known Allergies    Family History:  History reviewed. No pertinent family history.    Social History:  Social History     Socioeconomic History    Marital status:      Spouse name: Not on file    Number of children: Not on file    Years of education: Not on file    Highest education level: Not on file   Occupational History    Not on file   Tobacco Use    Smoking status: Every Day      "Current packs/day: 1.50     Average packs/day: 1.5 packs/day for 41.9 years (62.9 ttl pk-yrs)     Types: Cigarettes     Start date: 1982    Smokeless tobacco: Never   Substance and Sexual Activity    Alcohol use: Not on file    Drug use: Not on file    Sexual activity: Not on file   Other Topics Concern    Not on file   Social History Narrative    Not on file     Social Determinants of Health     Financial Resource Strain: Not on file   Food Insecurity: Not on file   Transportation Needs: Not on file   Physical Activity: Not on file   Stress: Not on file   Social Connections: Not on file   Intimate Partner Violence: Not on file   Housing Stability: Not on file         Physical Exam:  Vitals/ General Appearance:   Weight/BMI: Body mass index is 33.57 kg/m².  /79   Pulse 92   Temp 36.5 °C (97.7 °F) (Temporal)   Resp 16   Ht 1.854 m (6' 1\")   Wt 115 kg (254 lb 6.6 oz)   SpO2 95%   Vitals:    12/03/23 0045 12/03/23 0600 12/03/23 0745 12/03/23 1251   BP: 124/80 126/85 112/78 121/79   Pulse: 78 83 89 92   Resp: 18 16 16 16   Temp: 36.4 °C (97.5 °F) 36.6 °C (97.9 °F) 36.8 °C (98.2 °F) 36.5 °C (97.7 °F)   TempSrc: Temporal Temporal Temporal Temporal   SpO2: 92% 93% 94% 95%   Weight:       Height:         Oxygen Therapy:  Pulse Oximetry: 95 %, O2 (LPM): 0, O2 Delivery Device: None - Room Air    Constitutional:   Well developed, Well nourished, No acute distress  HENMT:  Normocephalic, Atraumatic, Oropharynx moist mucous membranes, No oral exudates, Nose normal.  No thyromegaly.  Eyes:  EOMI, Conjunctiva normal, No discharge.  Neck:  Normal range of motion, No cervical tenderness,  no JVD.  Cardiovascular:  Normal heart rate, Normal rhythm, No murmurs, No rubs, No gallops.   Extremitites with intact distal pulses, no cyanosis, or edema.  Lungs:  Normal breath sounds, breath sounds clear to auscultation bilaterally,  no rales, no rhonchi, no wheezing.   Abdomen: Bowel sounds normal, Soft, No tenderness, No " guarding, No rebound, No masses, No hepatosplenomegaly.  Skin: Warm, Dry, No erythema, No rash, no induration.  Neurologic: Alert & oriented x 3, No focal deficits noted, cranial nerves II through X are intact.  Psychiatric: Affect normal, Judgment normal, Mood normal.      MDM (Data Review):     Records reviewed and summarized in current documentation    Lab Data Review:  Recent Results (from the past 24 hour(s))   CBC without Differential    Collection Time: 12/03/23 12:55 AM   Result Value Ref Range    WBC 9.8 4.8 - 10.8 K/uL    RBC 4.85 4.70 - 6.10 M/uL    Hemoglobin 15.2 14.0 - 18.0 g/dL    Hematocrit 43.7 42.0 - 52.0 %    MCV 90.1 81.4 - 97.8 fL    MCH 31.3 27.0 - 33.0 pg    MCHC 34.8 32.3 - 36.5 g/dL    RDW 41.5 35.9 - 50.0 fL    Platelet Count 237 164 - 446 K/uL    MPV 10.4 9.0 - 12.9 fL   Comp Metabolic Panel (CMP)    Collection Time: 12/03/23 12:55 AM   Result Value Ref Range    Sodium 136 135 - 145 mmol/L    Potassium 3.8 3.6 - 5.5 mmol/L    Chloride 103 96 - 112 mmol/L    Co2 20 20 - 33 mmol/L    Anion Gap 13.0 7.0 - 16.0    Glucose 231 (H) 65 - 99 mg/dL    Bun 9 8 - 22 mg/dL    Creatinine 0.70 0.50 - 1.40 mg/dL    Calcium 8.6 8.5 - 10.5 mg/dL    Correct Calcium 8.6 8.5 - 10.5 mg/dL    AST(SGOT) 173 (H) 12 - 45 U/L    ALT(SGPT) 42 2 - 50 U/L    Alkaline Phosphatase 56 30 - 99 U/L    Total Bilirubin 0.6 0.1 - 1.5 mg/dL    Albumin 4.0 3.2 - 4.9 g/dL    Total Protein 6.9 6.0 - 8.2 g/dL    Globulin 2.9 1.9 - 3.5 g/dL    A-G Ratio 1.4 g/dL   Magnesium    Collection Time: 12/03/23 12:55 AM   Result Value Ref Range    Magnesium 2.1 1.5 - 2.5 mg/dL   Lipid Profile (Tomorrow AM)    Collection Time: 12/03/23 12:55 AM   Result Value Ref Range    Cholesterol,Tot 228 (H) 100 - 199 mg/dL    Triglycerides 366 (H) 0 - 149 mg/dL    HDL 24 (A) >=40 mg/dL     (H) <100 mg/dL   ESTIMATED GFR    Collection Time: 12/03/23 12:55 AM   Result Value Ref Range    GFR (CKD-EPI) 112 >60 mL/min/1.73 m 2   POCT glucose device  results    Collection Time: 12/03/23  8:40 AM   Result Value Ref Range    POC Glucose, Blood 314 (H) 65 - 99 mg/dL   POCT glucose device results    Collection Time: 12/03/23 12:11 PM   Result Value Ref Range    POC Glucose, Blood 275 (H) 65 - 99 mg/dL       Imaging/Procedures Review:    Chest Xray:  Reviewed    EKG:   As in HPI.     MDM (Assessment and Plan):     Active Hospital Problems    Diagnosis     Acute coronary syndromes (HCC) [I24.9]     Obstructive sleep apnea [G47.33]     Primary hypertension [I10]     Type 2 diabetes mellitus without complication, without long-term current use of insulin (HCC) [E11.9]     Mixed hyperlipidemia [E78.2]     BMI 32.0-32.9,adult [Z68.32]        I have independently interpreted and reviewed echocardiogram's actual images with patient which showed 50% left ventricular systolic function. There is hypokinesis of the inferior and inferolateral walls  No evidence of pulmonary hypertension. No significant valvular disease.    Coronary arterial disease s/p Lx stent in 12/2023:  Betablocker as Carvedilol 6.25 mg po 2x daily.  ASA 81 mg po daily and Prasufrel 10mg 1x daily  Statin as Atorvastatin 80 mg po daily  ACE-I as Lisinopril 2.5 mg po daily.    In terms of patient's co-morbiditie such as established cardiovascular disease and type II diabetes mellitus, based on recent trial, patient is indicated to be placed on Empagliflozin for mortality reduction benefit. Based on recent data, such therapy has a relative risk reduction of 38% and absolute risk reduction of 2.2% for cardiovascular death. Consider adding in the outpatient setting.     Hypertension:  Optimize control with BB, ACE-I or ARB as permitted above in conjunction with CAD treatment.     Hyperlipidemia:  Optimize statin as within guidelines of CAD treatment as above.    Feliberto Bolanos MD.   Cardiology Inpatient Service.  [facility].  [phone].  Krystin Cormier.

## 2023-12-11 SDOH — ECONOMIC STABILITY: INCOME INSECURITY: HOW HARD IS IT FOR YOU TO PAY FOR THE VERY BASICS LIKE FOOD, HOUSING, MEDICAL CARE, AND HEATING?: SOMEWHAT HARD

## 2023-12-11 SDOH — ECONOMIC STABILITY: HOUSING INSECURITY
IN THE LAST 12 MONTHS, WAS THERE A TIME WHEN YOU DID NOT HAVE A STEADY PLACE TO SLEEP OR SLEPT IN A SHELTER (INCLUDING NOW)?: NO

## 2023-12-11 SDOH — HEALTH STABILITY: PHYSICAL HEALTH: ON AVERAGE, HOW MANY MINUTES DO YOU ENGAGE IN EXERCISE AT THIS LEVEL?: 0 MIN

## 2023-12-11 SDOH — ECONOMIC STABILITY: TRANSPORTATION INSECURITY
IN THE PAST 12 MONTHS, HAS THE LACK OF TRANSPORTATION KEPT YOU FROM MEDICAL APPOINTMENTS OR FROM GETTING MEDICATIONS?: NO

## 2023-12-11 SDOH — HEALTH STABILITY: PHYSICAL HEALTH: ON AVERAGE, HOW MANY DAYS PER WEEK DO YOU ENGAGE IN MODERATE TO STRENUOUS EXERCISE (LIKE A BRISK WALK)?: 0 DAYS

## 2023-12-11 SDOH — ECONOMIC STABILITY: INCOME INSECURITY: IN THE LAST 12 MONTHS, WAS THERE A TIME WHEN YOU WERE NOT ABLE TO PAY THE MORTGAGE OR RENT ON TIME?: NO

## 2023-12-11 SDOH — ECONOMIC STABILITY: TRANSPORTATION INSECURITY
IN THE PAST 12 MONTHS, HAS LACK OF TRANSPORTATION KEPT YOU FROM MEETINGS, WORK, OR FROM GETTING THINGS NEEDED FOR DAILY LIVING?: NO

## 2023-12-11 SDOH — ECONOMIC STABILITY: FOOD INSECURITY: WITHIN THE PAST 12 MONTHS, YOU WORRIED THAT YOUR FOOD WOULD RUN OUT BEFORE YOU GOT MONEY TO BUY MORE.: NEVER TRUE

## 2023-12-11 SDOH — ECONOMIC STABILITY: TRANSPORTATION INSECURITY
IN THE PAST 12 MONTHS, HAS LACK OF RELIABLE TRANSPORTATION KEPT YOU FROM MEDICAL APPOINTMENTS, MEETINGS, WORK OR FROM GETTING THINGS NEEDED FOR DAILY LIVING?: NO

## 2023-12-11 SDOH — ECONOMIC STABILITY: HOUSING INSECURITY

## 2023-12-11 SDOH — ECONOMIC STABILITY: FOOD INSECURITY: WITHIN THE PAST 12 MONTHS, THE FOOD YOU BOUGHT JUST DIDN'T LAST AND YOU DIDN'T HAVE MONEY TO GET MORE.: NEVER TRUE

## 2023-12-11 SDOH — HEALTH STABILITY: MENTAL HEALTH
STRESS IS WHEN SOMEONE FEELS TENSE, NERVOUS, ANXIOUS, OR CAN'T SLEEP AT NIGHT BECAUSE THEIR MIND IS TROUBLED. HOW STRESSED ARE YOU?: RATHER MUCH

## 2023-12-11 ASSESSMENT — SOCIAL DETERMINANTS OF HEALTH (SDOH)
HOW OFTEN DO YOU HAVE SIX OR MORE DRINKS ON ONE OCCASION: NEVER
IN A TYPICAL WEEK, HOW MANY TIMES DO YOU TALK ON THE PHONE WITH FAMILY, FRIENDS, OR NEIGHBORS?: NEVER
IN A TYPICAL WEEK, HOW MANY TIMES DO YOU TALK ON THE PHONE WITH FAMILY, FRIENDS, OR NEIGHBORS?: NEVER
HOW OFTEN DO YOU GET TOGETHER WITH FRIENDS OR RELATIVES?: ONCE A WEEK
DO YOU BELONG TO ANY CLUBS OR ORGANIZATIONS SUCH AS CHURCH GROUPS UNIONS, FRATERNAL OR ATHLETIC GROUPS, OR SCHOOL GROUPS?: NO
HOW HARD IS IT FOR YOU TO PAY FOR THE VERY BASICS LIKE FOOD, HOUSING, MEDICAL CARE, AND HEATING?: SOMEWHAT HARD
HOW MANY DRINKS CONTAINING ALCOHOL DO YOU HAVE ON A TYPICAL DAY WHEN YOU ARE DRINKING: PATIENT DOES NOT DRINK
WITHIN THE PAST 12 MONTHS, YOU WORRIED THAT YOUR FOOD WOULD RUN OUT BEFORE YOU GOT THE MONEY TO BUY MORE: NEVER TRUE
DO YOU BELONG TO ANY CLUBS OR ORGANIZATIONS SUCH AS CHURCH GROUPS UNIONS, FRATERNAL OR ATHLETIC GROUPS, OR SCHOOL GROUPS?: NO
HOW OFTEN DO YOU ATTENT MEETINGS OF THE CLUB OR ORGANIZATION YOU BELONG TO?: NEVER
HOW OFTEN DO YOU GET TOGETHER WITH FRIENDS OR RELATIVES?: ONCE A WEEK
HOW OFTEN DO YOU ATTEND CHURCH OR RELIGIOUS SERVICES?: MORE THAN 4 TIMES PER YEAR
HOW OFTEN DO YOU ATTENT MEETINGS OF THE CLUB OR ORGANIZATION YOU BELONG TO?: NEVER
HOW OFTEN DO YOU ATTEND CHURCH OR RELIGIOUS SERVICES?: MORE THAN 4 TIMES PER YEAR
HOW OFTEN DO YOU HAVE A DRINK CONTAINING ALCOHOL: NEVER

## 2023-12-11 ASSESSMENT — LIFESTYLE VARIABLES
SKIP TO QUESTIONS 9-10: 1
AUDIT-C TOTAL SCORE: 0
HOW OFTEN DO YOU HAVE A DRINK CONTAINING ALCOHOL: NEVER
HOW MANY STANDARD DRINKS CONTAINING ALCOHOL DO YOU HAVE ON A TYPICAL DAY: PATIENT DOES NOT DRINK
HOW OFTEN DO YOU HAVE SIX OR MORE DRINKS ON ONE OCCASION: NEVER

## 2023-12-12 ENCOUNTER — OFFICE VISIT (OUTPATIENT)
Dept: MEDICAL GROUP | Facility: MEDICAL CENTER | Age: 48
End: 2023-12-12
Payer: MEDICARE

## 2023-12-12 VITALS
HEART RATE: 85 BPM | BODY MASS INDEX: 33.18 KG/M2 | SYSTOLIC BLOOD PRESSURE: 126 MMHG | RESPIRATION RATE: 16 BRPM | HEIGHT: 73 IN | WEIGHT: 250.33 LBS | OXYGEN SATURATION: 95 % | DIASTOLIC BLOOD PRESSURE: 64 MMHG | TEMPERATURE: 97.2 F

## 2023-12-12 DIAGNOSIS — G47.33 OBSTRUCTIVE SLEEP APNEA: ICD-10-CM

## 2023-12-12 DIAGNOSIS — F17.200 CURRENT EVERY DAY SMOKER: ICD-10-CM

## 2023-12-12 DIAGNOSIS — Z09 HOSPITAL DISCHARGE FOLLOW-UP: ICD-10-CM

## 2023-12-12 DIAGNOSIS — I10 PRIMARY HYPERTENSION: ICD-10-CM

## 2023-12-12 DIAGNOSIS — E11.9 TYPE 2 DIABETES MELLITUS WITHOUT COMPLICATION, WITHOUT LONG-TERM CURRENT USE OF INSULIN (HCC): ICD-10-CM

## 2023-12-12 DIAGNOSIS — R93.89 ABNORMAL CXR: ICD-10-CM

## 2023-12-12 DIAGNOSIS — Z00.00 PE (PHYSICAL EXAM), ANNUAL: ICD-10-CM

## 2023-12-12 DIAGNOSIS — E78.2 MIXED HYPERLIPIDEMIA: ICD-10-CM

## 2023-12-12 DIAGNOSIS — I24.9 ACUTE CORONARY SYNDROMES (HCC): ICD-10-CM

## 2023-12-12 PROCEDURE — 3074F SYST BP LT 130 MM HG: CPT | Performed by: NURSE PRACTITIONER

## 2023-12-12 PROCEDURE — 99214 OFFICE O/P EST MOD 30 MIN: CPT | Performed by: NURSE PRACTITIONER

## 2023-12-12 PROCEDURE — 3078F DIAST BP <80 MM HG: CPT | Performed by: NURSE PRACTITIONER

## 2023-12-12 RX ORDER — VARENICLINE TARTRATE 0.5 (11)-1
KIT ORAL
Qty: 53 EACH | Refills: 1 | Status: SHIPPED | OUTPATIENT
Start: 2023-12-12 | End: 2024-03-26

## 2023-12-12 RX ORDER — DOXYCYCLINE HYCLATE 100 MG
100 TABLET ORAL 2 TIMES DAILY
Qty: 14 TABLET | Refills: 0 | Status: SHIPPED | OUTPATIENT
Start: 2023-12-12 | End: 2023-12-19

## 2023-12-12 ASSESSMENT — FIBROSIS 4 INDEX: FIB4 SCORE: 5.63

## 2023-12-12 NOTE — ASSESSMENT & PLAN NOTE
New to me.  Patient is here for hospital follow-up.  He was admitted to [facility] from 12/2/2023 through 12/30/2023 for acute coronary syndrome.  He started having chest pain and was transferred from [place].  His troponins were high with ST depression on EKG.  Patient with history of diabetes hypertension, hyperlipidemia and sleep apnea, stopped taking all his medications.  Echo completed with EF at 55%.  Left heart cath completed.  His blood pressure today within normal limits at 126/64.  He is on carvedilol and lisinopril.  Started on Effient 10 mg and aspirin 81 mg.  His diabetes is not well-controlled at 10.5 A1c.  Metformin was restarted at 500 mg, tolerating well.  Also on Lantus nightly.  His cholesterol lipid was abnormal and he started on atorvastatin 80 mg.  He is following up with cardiology for the next appointment on 12/19/2023 with XENIA Olivas.

## 2023-12-12 NOTE — ASSESSMENT & PLAN NOTE
Chronic problem. A1C    Latest Reference Range & Units 12/02/23 05:58   Glycohemoglobin 4.0 - 5.6 % 10.5 (H)   (H): Data is abnormally high.  Chronic condition. Started on Lantus and Metformin.

## 2023-12-12 NOTE — PROGRESS NOTES
Chief Complaint   Patient presents with    Hospital Follow-up     Heart attack       HISTORY OF PRESENT ILLNESS: Patient is a 48 y.o. male, established patient who presents today to discuss medical problems as listed below:        Allergies: Patient has no known allergies.    Current Outpatient Medications   Medication Sig Dispense Refill    Varenicline Tartrate, Starter, (CHANTIX STARTING MONTH PAK) 0.5 MG X 11 & 1 MG X 42 Tablet Therapy Pack Days 1-3: 0.5 mg once daily. Days 4-7: 0.5 mg twice daily. Then 1 mg twice daily. 53 Each 1    doxycycline (VIBRAMYCIN) 100 MG Tab Take 1 Tablet by mouth 2 times a day for 7 days. 14 Tablet 0    prasugrel (EFFIENT) 10 MG Tab Take 1 Tablet by mouth every day. 90 Tablet 3    atorvastatin (LIPITOR) 80 MG tablet Take 1 Tablet by mouth every evening. 90 Tablet 3    aspirin 81 MG EC tablet Take 1 Tablet by mouth every day. 100 Tablet 3    carvedilol (COREG) 6.25 MG Tab Take 1 Tablet by mouth 2 times a day with meals. 60 Tablet 3    lisinopril (PRINIVIL) 2.5 MG Tab Take 1 Tablet by mouth every day. 30 Tablet 3    metFORMIN (GLUCOPHAGE) 500 MG Tab Take 1 Tablet by mouth 2 times a day with meals. 60 Tablet 1    insulin glargine (LANTUS SOLOSTAR) 100 UNIT/ML Solution Pen-injector injection Inject 10 Units under the skin every evening. 15 mL 1    Insulin Pen Needle (PEN NEEDLES) 32G X 4 MM Misc USE WITH INSULIN  Each 3    HYDROcodone/acetaminophen (NORCO)  MG Tab Take 1 Tablet by mouth 4 times a day.      Chlorpheniramine-DM (COUGH & COLD PO) Take 1 Dose by mouth every 6 hours as needed (Cough).       No current facility-administered medications for this visit.       Allergies, past medical history, past surgical history, family history, social history reviewed and updated.    Review of Systems:     - Constitutional: Negative for fever, chills, unexpected weight change, and fatigue/generalized weakness.     - HEENT: Negative for headaches, vision changes, hearing changes,  Nutrition Services:  RD reviewed patient's chart today due to patient being NPO/on a clear liquid diet for x5 days now. Per RN, patient needs a scan and then she can go on a regular diet. RN not sure what time scan will be completed today.    RD recommends advancing diet as soon as medically able. If unable to advance diet within 1-2 days, recommend considering nutrition support. Will continue to follow per protocol.     "ear pain, ear discharge, rhinorrhea, sinus congestion, sore throat, and neck pain.      - Respiratory: cough , chest congestion. Negative for, sputum production,  dyspnea, wheezing, and crackles.      - Cardiovascular: Negative for chest pain, palpitations, orthopnea, and bilateral lower extremity edema.     - Gastrointestinal: Negative for heartburn, nausea, vomiting, abdominal pain, hematochezia, melena, diarrhea, constipation, and greasy/foul-smelling stools.     - Genitourinary: Negative for dysuria, polyuria, hematuria, pyuria, urinary urgency, and urinary incontinence.    - Musculoskeletal: Negative for myalgias, back pain, and joint pain.     - Skin: Negative for rash, itching, cyanotic skin color change.     - Neurological: Negative for dizziness, tingling, tremors, focal sensory deficit, focal weakness and headaches.     - Endo/Heme/Allergies: Does not bruise/bleed easily.     - Psychiatric/Behavioral: Negative for depression, suicidal/homicidal ideation and memory loss.      All other systems reviewed and are negative    Exam:    /64   Pulse 85   Temp 36.2 °C (97.2 °F) (Temporal)   Resp 16   Ht 1.854 m (6' 1\")   Wt 114 kg (250 lb 5.3 oz)   SpO2 95%   BMI 33.03 kg/m²  Body mass index is 33.03 kg/m².    Physical Exam:  Constitutional: Well-developed and well-nourished. Not diaphoretic. No distress.   Skin: Skin is warm and dry. No rash noted.  Head: Atraumatic without lesions.  Eyes: Conjunctivae and extraocular motions are normal. Pupils are equal, round, and reactive to light. No scleral icterus.   Ears:  External ears unremarkable. Tympanic membranes clear and intact.  Nose: Nares patent. Septum midline. Turbinates without erythema nor edema. No discharge.   Mouth/Throat: Dentition is normal. Tongue normal. Oropharynx is clear and moist. Posterior pharynx without erythema or exudates.  Neck: Supple, trachea midline. Normal range of motion. No thyromegaly present. No lymphadenopathy--cervical " or supraclavicular.  Cardiovascular: Regular rate and rhythm, S1 and S2 without murmur, rubs, or gallops.    Chest: Effort normal. Diminished in the bases.  No adventitious sounds. No CVA tenderness.  Abdomen: Soft, non tender, and without distention. Active bowel sounds in all four quadrants. No rebound, guarding, masses or HSM.  : Negative for dysuria, polyuria, hematuria, pyuria, urinary urgency, and urinary incontinence.  Extremities: No cyanosis, clubbing, erythema, nor edema. Distal pulses intact and symmetric.   Musculoskeletal: All major joints AROM full in all directions without pain.  Neurological: Alert and oriented x 3. DTRs 2+/3 and symmetric. No cranial nerve deficit. 5/5 myotomes. Sensation intact. Negative Rhomberg.  Psychiatric:  Behavior, mood, and affect are appropriate.  MA/nursing note and vitals reviewed.    LABS, imagine, EKG, CXR, ECHO, notes: 12/2-3/23  results reviewed and discussed with the patient, questions answered.    Assessment/Plan:  Hospital discharge follow-up  New to me.  Patient is here for hospital follow-up.  He was admitted to [facility] from 12/2/2023 through 12/30/2023 for acute coronary syndrome.  He started having chest pain and was transferred from [place].  His troponins were high with ST depression on EKG.  Patient with history of diabetes hypertension, hyperlipidemia and sleep apnea, stopped taking all his medications.  Echo completed with EF at 55%.  Left heart cath completed.  His blood pressure today within normal limits at 126/64.  He is on carvedilol and lisinopril.  Started on Effient 10 mg and aspirin 81 mg.  His diabetes is not well-controlled at 10.5 A1c.  Metformin was restarted at 500 mg, tolerating well.  Also on Lantus nightly.  His cholesterol lipid was abnormal and he started on atorvastatin 80 mg.  He is following up with cardiology for the next appointment on 12/19/2023 with XENIA Olivas.    Current every day smoker  New to me.  Was smoking 3 packs before hospital admission 12/2/23, now down to 1 pack. 45 pack yrs. Nicotine patch did not help. Has tried chantix in the past. Denies SI.     Acute coronary syndromes (HCC)  MI with  1 stent on 2/3/23. On Carvedilol 6.25 and lisinopril 2.5 mg.     Type 2 diabetes mellitus without complication, without long-term current use of insulin (HCC)  Chronic problem. A1C    Latest Reference Range & Units 12/02/23 05:58   Glycohemoglobin 4.0 - 5.6 % 10.5 (H)   (H): Data is abnormally high.  Chronic condition. Started on Lantus and Metformin.     Obstructive sleep apnea  Chronic problem. Stopbang score at 8. Cpap at home. Nt following with pulm.     Primary hypertension  Chronic condition. On Carvedilol and Lisinopril     Mixed hyperlipidemia  Chronic condition. Taking atorvastatin.     Abnormal CXR  IMPRESSION:     LEFT lower lobe opacity probably atelectasis or pneumonia           Exam Ended: 12/02/23  5:59 AM         Pt is doing better , however still reports left chest intermittent tenderness. MI on 12/2/23, CXR with abnormal results as above. No abx given during hospitalization. Smoker with smokers cough.     1. Hospital discharge follow-up  Follow-up.  Repeating the details discussed with patient and his wife.  All questions answered.    2. Current every day smoker  Patient counseled.  Trial of Chantix.  - Varenicline Tartrate, Starter, (CHANTIX STARTING MONTH PAK) 0.5 MG X 11 & 1 MG X 42 Tablet Therapy Pack; Days 1-3: 0.5 mg once daily. Days 4-7: 0.5 mg twice daily. Then 1 mg twice daily.  Dispense: 53 Each; Refill: 1  - Referral to Tobacco Cessation Program    3. Acute coronary syndromes (HCC)  Stable.  Following up with cardiology    4. Type 2 diabetes mellitus without complication, without long-term current use of insulin (HCC)  Uncontrolled, stable continue current regimen.  Follow-up next visit    5. Obstructive sleep apnea  - Referral to Pulmonary and Sleep Medicine    6. Primary  hypertension  Stable on current regimen.  Continue    7. Mixed hyperlipidemia    8. PE (physical exam), annual  - CBC WITHOUT DIFFERENTIAL; Future  - Comp Metabolic Panel; Future  - Lipid Profile; Future  - PSA TOTAL + %FREE; Future  - HEMOGLOBIN A1C; Future  - TSH WITH REFLEX TO FT4; Future  - VITAMIN D,25 HYDROXY (DEFICIENCY); Future    9. Abnormal CXR  Uncontrolled, stable.  Suspecting pneumonia, given symptoms, physical exam today, and x-ray results, no previous antibiotic treatment.  Giving decreased immunity, being a smoker and recent MI we will treat.  - doxycycline (VIBRAMYCIN) 100 MG Tab; Take 1 Tablet by mouth 2 times a day for 7 days.  Dispense: 14 Tablet; Refill: 0      Discussed with patient possible alternative diagnoses, patient is to take all medications as prescribed.      If symptoms persist FU w/PCP, if symptoms worsen go to emergency room.      If experiencing any side effects from prescribed medications report to the office immediately or go to emergency room.     Reviewed indication, dosage, usage and potential adverse effects of prescribed medications.      Reviewed risks and benefits of treatment plan. Patient verbalizes understanding of all instruction and verbally agrees to plan.     Discussed plan with the patient, and patient agrees to the above.      I personally reviewed prior external notes and test results pertinent to today's visit.      No follow-ups on file. 2 mos

## 2023-12-12 NOTE — ASSESSMENT & PLAN NOTE
New to me. Was smoking 3 packs before hospital admission 12/2/23, now down to 1 pack. 45 pack yrs. Nicotine patch did not help. Has tried chantix in the past. Denies SI.

## 2023-12-12 NOTE — ASSESSMENT & PLAN NOTE
IMPRESSION:     LEFT lower lobe opacity probably atelectasis or pneumonia           Exam Ended: 12/02/23  5:59 AM         Pt is doing better , however still reports left chest intermittent tenderness. MI on 12/2/23, CXR with abnormal results as above. No abx given during hospitalization. Smoker with smokers cough.

## 2023-12-14 ENCOUNTER — PATIENT MESSAGE (OUTPATIENT)
Dept: HEALTH INFORMATION MANAGEMENT | Facility: OTHER | Age: 48
End: 2023-12-14

## 2023-12-19 ENCOUNTER — OFFICE VISIT (OUTPATIENT)
Dept: CARDIOLOGY | Facility: MEDICAL CENTER | Age: 48
End: 2023-12-19
Attending: NURSE PRACTITIONER
Payer: MEDICARE

## 2023-12-19 VITALS
BODY MASS INDEX: 32.6 KG/M2 | DIASTOLIC BLOOD PRESSURE: 66 MMHG | RESPIRATION RATE: 16 BRPM | WEIGHT: 246 LBS | HEIGHT: 73 IN | HEART RATE: 86 BPM | SYSTOLIC BLOOD PRESSURE: 124 MMHG | OXYGEN SATURATION: 96 %

## 2023-12-19 DIAGNOSIS — I25.118 CORONARY ARTERY DISEASE OF NATIVE ARTERY OF NATIVE HEART WITH STABLE ANGINA PECTORIS (HCC): ICD-10-CM

## 2023-12-19 DIAGNOSIS — I10 PRIMARY HYPERTENSION: ICD-10-CM

## 2023-12-19 DIAGNOSIS — R07.9 CHEST PAIN, UNSPECIFIED TYPE: ICD-10-CM

## 2023-12-19 DIAGNOSIS — F17.200 CURRENT EVERY DAY SMOKER: ICD-10-CM

## 2023-12-19 DIAGNOSIS — I24.9 ACUTE CORONARY SYNDROMES (HCC): ICD-10-CM

## 2023-12-19 DIAGNOSIS — R25.2 LEG CRAMPING: Primary | ICD-10-CM

## 2023-12-19 DIAGNOSIS — G47.33 OBSTRUCTIVE SLEEP APNEA: ICD-10-CM

## 2023-12-19 DIAGNOSIS — E78.2 MIXED HYPERLIPIDEMIA: ICD-10-CM

## 2023-12-19 LAB — EKG IMPRESSION: NORMAL

## 2023-12-19 PROCEDURE — 3078F DIAST BP <80 MM HG: CPT | Performed by: NURSE PRACTITIONER

## 2023-12-19 PROCEDURE — 93010 ELECTROCARDIOGRAM REPORT: CPT | Performed by: INTERNAL MEDICINE

## 2023-12-19 PROCEDURE — 99215 OFFICE O/P EST HI 40 MIN: CPT | Performed by: NURSE PRACTITIONER

## 2023-12-19 PROCEDURE — 93005 ELECTROCARDIOGRAM TRACING: CPT | Performed by: NURSE PRACTITIONER

## 2023-12-19 PROCEDURE — 3074F SYST BP LT 130 MM HG: CPT | Performed by: NURSE PRACTITIONER

## 2023-12-19 PROCEDURE — 99213 OFFICE O/P EST LOW 20 MIN: CPT | Performed by: NURSE PRACTITIONER

## 2023-12-19 RX ORDER — LISINOPRIL 2.5 MG/1
2.5 TABLET ORAL DAILY
Qty: 90 TABLET | Refills: 3 | Status: SHIPPED | OUTPATIENT
Start: 2023-12-19

## 2023-12-19 RX ORDER — ATORVASTATIN CALCIUM 80 MG/1
80 TABLET, FILM COATED ORAL EVERY EVENING
Qty: 90 TABLET | Refills: 3 | Status: SHIPPED | OUTPATIENT
Start: 2023-12-19

## 2023-12-19 RX ORDER — PRASUGREL 10 MG/1
10 TABLET, FILM COATED ORAL DAILY
Qty: 90 TABLET | Refills: 3 | Status: SHIPPED | OUTPATIENT
Start: 2023-12-19

## 2023-12-19 RX ORDER — ASPIRIN 81 MG/1
81 TABLET ORAL DAILY
Qty: 100 TABLET | Refills: 3 | Status: SHIPPED | OUTPATIENT
Start: 2023-12-19

## 2023-12-19 RX ORDER — NITROGLYCERIN 0.4 MG/1
0.4 TABLET SUBLINGUAL PRN
Qty: 25 TABLET | Refills: 1 | Status: SHIPPED | OUTPATIENT
Start: 2023-12-19

## 2023-12-19 RX ORDER — CARVEDILOL 6.25 MG/1
6.25 TABLET ORAL 2 TIMES DAILY WITH MEALS
Qty: 180 TABLET | Refills: 3 | Status: SHIPPED | OUTPATIENT
Start: 2023-12-19

## 2023-12-19 ASSESSMENT — ENCOUNTER SYMPTOMS
MUSCULOSKELETAL NEGATIVE: 1
CONSTITUTIONAL NEGATIVE: 1
WHEEZING: 0
RESPIRATORY NEGATIVE: 1
PALPITATIONS: 0
ORTHOPNEA: 0
DIZZINESS: 0
EYES NEGATIVE: 1
HALLUCINATIONS: 0
PND: 0
NAUSEA: 0
SHORTNESS OF BREATH: 0
DEPRESSION: 0
NERVOUS/ANXIOUS: 1
CLAUDICATION: 0
NEUROLOGICAL NEGATIVE: 1
GASTROINTESTINAL NEGATIVE: 1
BRUISES/BLEEDS EASILY: 0
SENSORY CHANGE: 0

## 2023-12-19 ASSESSMENT — FIBROSIS 4 INDEX: FIB4 SCORE: 5.63

## 2023-12-19 NOTE — PROGRESS NOTES
Cardiology Follow up Note    DOS: 12/19/2023  [id number]  Hal Vilchis    Chief complaint/Reason for consult: post PCI    HPI: Pt is a 48 y.o. male who presents to the clinic today in follow up for post NSTEMI and PCI. Patient has a past medical history significant for but not limited to: CAD S/P PCI to LCx, hypertension, ERICK, type 2 diabetes, mixed hyperlipidemia, tobacco abuse. Patient transferred from [facility] on 12/2/2023 with chest pain, ST depressions and elevated troponins. He was taken to the cath lab and had a drug eluting stent placed in the distal left circumflex artery. Patient also had areas of cornary disease in the LAD proximal portion, 50% stneosi in the mid portion and 90% stenosis in distal postion of the right posterior descending. Procedure and medications reviewed in detail. Patient has already started cutting back on his smoking, going from 3 packs a day down to half a pack and an start chantix today. HE has set a goal for stopping by the beginning of the year. HE has also started making dietary changes, cutting out excess sugars and increasing his vegetables. He states he has been losing weight recently. He is taking this very seriously. We are going to repeat some lab work in Feb., echo in March, and patient has a referral in to sleep medicine to discuss his ERICK. Discussion had about the importance of treating ERICK and heart health. He has noticed some slight tinge pains around his heart that are very short and self limiting after a few minutes. Does not feel like the heart attack pains.       History reviewed. No pertinent past medical history.    History reviewed. No pertinent surgical history.    Social History     Socioeconomic History    Marital status:      Spouse name: Not on file    Number of children: Not on file    Years of education: Not on file    Highest education level: Associate degree: occupational, technical, or vocational program   Occupational History    Not on  file   Tobacco Use    Smoking status: Every Day     Current packs/day: 1.50     Average packs/day: 1.5 packs/day for 42.0 years (62.9 ttl pk-yrs)     Types: Cigarettes     Start date: 1982    Smokeless tobacco: Never   Substance and Sexual Activity    Alcohol use: Never    Drug use: Never    Sexual activity: Not on file   Other Topics Concern    Not on file   Social History Narrative    Not on file     Social Determinants of Health     Financial Resource Strain: Medium Risk (12/11/2023)    Overall Financial Resource Strain (CARDIA)     Difficulty of Paying Living Expenses: Somewhat hard   Food Insecurity: No Food Insecurity (12/11/2023)    Hunger Vital Sign     Worried About Running Out of Food in the Last Year: Never true     Ran Out of Food in the Last Year: Never true   Transportation Needs: No Transportation Needs (12/11/2023)    PRAPARE - Transportation     Lack of Transportation (Medical): No     Lack of Transportation (Non-Medical): No   Physical Activity: Inactive (12/11/2023)    Exercise Vital Sign     Days of Exercise per Week: 0 days     Minutes of Exercise per Session: 0 min   Stress: Stress Concern Present (12/11/2023)    [employer] [place] [employer] - Occupational Stress Questionnaire     Feeling of Stress : Rather much   Social Connections: Moderately Isolated (12/11/2023)    Social Connection and Isolation Panel [NHANES]     Frequency of Communication with Friends and Family: Never     Frequency of Social Gatherings with Friends and Family: Once a week     Attends Yazidism Services: More than 4 times per year     Active Member of Clubs or Organizations: No     Attends Club or Organization Meetings: Never     Marital Status:    Intimate Partner Violence: Not on file   Housing Stability: Unknown (12/11/2023)    Housing Stability Vital Sign     Unable to Pay for Housing in the Last Year: No     Number of Places Lived in the Last Year: Not on file     Unstable Housing in the Last  Year: No       History reviewed. No pertinent family history.    No Known Allergies    Current Outpatient Medications   Medication Sig Dispense Refill    carvedilol (COREG) 6.25 MG Tab Take 1 Tablet by mouth 2 times a day with meals. 180 Tablet 3    atorvastatin (LIPITOR) 80 MG tablet Take 1 Tablet by mouth every evening. 90 Tablet 3    aspirin 81 MG EC tablet Take 1 Tablet by mouth every day. 100 Tablet 3    lisinopril (PRINIVIL) 2.5 MG Tab Take 1 Tablet by mouth every day. 90 Tablet 3    prasugrel (EFFIENT) 10 MG Tab Take 1 Tablet by mouth every day. 90 Tablet 3    nitroglycerin (NITROSTAT) 0.4 MG SL Tab Place 1 Tablet under the tongue as needed for Chest Pain (for chest pain more than 10 minutes in duration). 25 Tablet 1    metFORMIN (GLUCOPHAGE) 500 MG Tab Take 1 Tablet by mouth 2 times a day with meals. 60 Tablet 1    insulin glargine (LANTUS SOLOSTAR) 100 UNIT/ML Solution Pen-injector injection Inject 10 Units under the skin every evening. 15 mL 1    Insulin Pen Needle (PEN NEEDLES) 32G X 4 MM Misc USE WITH INSULIN  Each 3    HYDROcodone/acetaminophen (NORCO)  MG Tab Take 1 Tablet by mouth 4 times a day.      Chlorpheniramine-DM (COUGH & COLD PO) Take 1 Dose by mouth every 6 hours as needed (Cough).      Varenicline Tartrate, Starter, (CHANTIX STARTING MONTH PAK) 0.5 MG X 11 & 1 MG X 42 Tablet Therapy Pack Days 1-3: 0.5 mg once daily. Days 4-7: 0.5 mg twice daily. Then 1 mg twice daily. (Patient not taking: Reported on 12/19/2023) 53 Each 1     No current facility-administered medications for this visit.       Vitals:    12/19/23 0744   BP: 124/66   Pulse: 86   Resp: 16   SpO2: 96%         Review of Systems   Constitutional: Negative.  Negative for malaise/fatigue.   HENT: Negative.     Eyes: Negative.    Respiratory: Negative.  Negative for shortness of breath and wheezing.    Cardiovascular:  Negative for chest pain, palpitations, orthopnea, claudication, leg swelling and PND.    Gastrointestinal: Negative.  Negative for nausea.   Genitourinary: Negative.    Musculoskeletal: Negative.    Skin: Negative.    Neurological: Negative.  Negative for dizziness and sensory change.   Endo/Heme/Allergies: Negative.  Does not bruise/bleed easily.   Psychiatric/Behavioral:  Negative for depression and hallucinations. The patient is nervous/anxious.         Prior echo/stress results reviewed: LVEF 50-55%, hypokinesis of inferior and inferolateral walls, no significant valvular abnormalities    Prior cath results reviewed:   Left main: normal  Left anterior descendin% stenosis in the proximal portion.  1 major diagonal branch is noted.  Distal LAD has diffuse luminal irregularities.  Left circumflex: First marginal branch comes off in the proximal portion, it has focal 60% stenosis in the proximal portion.  Distal circumflex artery is occluded, distally supplies large second marginal branch.  Right coronary: Diffuse 50% stenosis in the mid to distal portion.  Right posterior descending artery has focal 90% stenosis in the distal portion, small vessel at this location., dominant      Interventions:  IVUS guided angioplasty and placement of a 3.5 by 16mm Synergy drug-eluting stent in distal  left circumflex coronary artery.    EKG interpreted by me: Sinus rhythm     Physical Exam  Constitutional:       Appearance: Normal appearance.   HENT:      Head: Normocephalic.   Eyes:      Pupils: Pupils are equal, round, and reactive to light.   Neck:      Vascular: No JVD.   Cardiovascular:      Rate and Rhythm: Normal rate and regular rhythm.      Pulses: Normal pulses.      Heart sounds: Normal heart sounds.   Pulmonary:      Effort: Pulmonary effort is normal.      Breath sounds: Normal breath sounds.   Abdominal:      General: Abdomen is flat.      Palpations: Abdomen is soft.   Musculoskeletal:      Cervical back: Normal range of motion.      Right lower leg: No edema.      Left lower leg: No edema.  "  Skin:     General: Skin is warm and dry.   Neurological:      Mental Status: He is alert and oriented to person, place, and time.   Psychiatric:         Mood and Affect: Mood normal.         Behavior: Behavior normal.          Data:  Lipids:   Lab Results   Component Value Date/Time    CHOLSTRLTOT 228 (H) 12/03/2023 12:55 AM    TRIGLYCERIDE 366 (H) 12/03/2023 12:55 AM    HDL 24 (A) 12/03/2023 12:55 AM     (H) 12/03/2023 12:55 AM        BMP:  Lab Results   Component Value Date/Time    SODIUM 136 12/03/2023 0055    POTASSIUM 3.8 12/03/2023 0055    CHLORIDE 103 12/03/2023 0055    CO2 20 12/03/2023 0055    GLUCOSE 231 (H) 12/03/2023 0055    BUN 9 12/03/2023 0055    CREATININE 0.70 12/03/2023 0055    CALCIUM 8.6 12/03/2023 0055    ANION 13.0 12/03/2023 0055       GFR:  No results found for: \"IFAFRICA\", \"IFNOTAFR\"     TSH:   No results found for: \"TSHULTRASEN\"    MAGNESIUM:  Lab Results   Component Value Date/Time    MAGNESIUM 2.1 12/03/2023 0055        THYROXINE (T4):   No results found for: \"FREEDIR\"     CBC:   Lab Results   Component Value Date/Time    WBC 9.8 12/03/2023 12:55 AM    RBC 4.85 12/03/2023 12:55 AM    HEMOGLOBIN 15.2 12/03/2023 12:55 AM    HEMATOCRIT 43.7 12/03/2023 12:55 AM    MCV 90.1 12/03/2023 12:55 AM    MCH 31.3 12/03/2023 12:55 AM    MCHC 34.8 12/03/2023 12:55 AM    RDW 41.5 12/03/2023 12:55 AM    PLATELETCT 237 12/03/2023 12:55 AM    MPV 10.4 12/03/2023 12:55 AM    NEUTSPOLYS 54.50 12/02/2023 05:58 AM    LYMPHOCYTES 35.40 12/02/2023 05:58 AM    MONOCYTES 5.70 12/02/2023 05:58 AM    EOSINOPHILS 3.00 12/02/2023 05:58 AM    BASOPHILS 1.10 12/02/2023 05:58 AM    IMMGRAN 0.30 12/02/2023 05:58 AM    NRBC 0.00 12/02/2023 05:58 AM    NEUTS 4.32 12/02/2023 05:58 AM    LYMPHS 2.81 12/02/2023 05:58 AM    MONOS 0.45 12/02/2023 05:58 AM    EOS 0.24 12/02/2023 05:58 AM    BASO 0.09 12/02/2023 05:58 AM    IMMGRANAB 0.02 12/02/2023 05:58 AM    NRBCAB 0.00 12/02/2023 05:58 AM        CBC w/o DIFF  Lab " "Results   Component Value Date/Time    WBC 9.8 12/03/2023 12:55 AM    RBC 4.85 12/03/2023 12:55 AM    HEMOGLOBIN 15.2 12/03/2023 12:55 AM    MCV 90.1 12/03/2023 12:55 AM    MCH 31.3 12/03/2023 12:55 AM    MCHC 34.8 12/03/2023 12:55 AM    RDW 41.5 12/03/2023 12:55 AM    MPV 10.4 12/03/2023 12:55 AM       LIVER:  Lab Results   Component Value Date/Time    ALKPHOSPHAT 56 12/03/2023 12:55 AM    ASTSGOT 173 (H) 12/03/2023 12:55 AM    ALTSGPT 42 12/03/2023 12:55 AM    TBILIRUBIN 0.6 12/03/2023 12:55 AM       BNP:  No results found for: \"BNPBTYPENAT\"    PT/INR:  Lab Results   Component Value Date/Time    PROTHROMBTM 14.0 12/02/2023 05:58 AM    INR 1.07 12/02/2023 05:58 AM             Impression/Plan:  1. Leg cramping  MAGNESIUM      2. Chest pain, unspecified type  EKG    EC-ECHOCARDIOGRAM COMPLETE W/O CONT      3. Acute coronary syndromes (HCC)  carvedilol (COREG) 6.25 MG Tab    atorvastatin (LIPITOR) 80 MG tablet    aspirin 81 MG EC tablet    lisinopril (PRINIVIL) 2.5 MG Tab    prasugrel (EFFIENT) 10 MG Tab      4. Coronary artery disease of native artery of native heart with stable angina pectoris (HCC)  EC-ECHOCARDIOGRAM COMPLETE W/O CONT    nitroglycerin (NITROSTAT) 0.4 MG SL Tab       - blood pressure well controlled today in office   - goal less than 130/80   - education about proper daily blood pressure technique   - continue current dosing of carvedilol and lisinopril   - discussed the beta blockade optimizing oxygen use and prevention of cardiac remodeling  -  patient on high dose atorvastatin 80mg daily   - last lipid panel poorly controlled   - repeat lipid panel in Feb   - goal LDL 55 or less and triglycerides less than 150   - possibly add Zetia 10mg daily to regimen if goals are not met through high dose statin therapy and dietary changes   - did discuss that patient has two of three risk factors for possible development of heart failure and we need to be aggressive to prevent that   - sublingual " nitroglycerin 0.4mg as needed for chest pain lasting longer than 10 minutes   - did discuss possibility of chronic angina   - continue to work on total smoking cessation; can use chantix to help   - referral in for sleep medicine and importance of proper treatment of sleep apnea for heart health discussed   - dual antiplatelet therapy for one year   - explained importance of not missing aspirin or prasugrel to prevent in stent thrombosis   - will defer treatment of type 2 diabetes to primary care   - echocardiogram in March   -  Return to clinic in April     Lifestyle Modification for better heart health care as well as beneficial for prevention of worsening cardiac disease. Lifestyle modification discussed includes diet and reduction of sodium. Patients should eat more of a Mediterranean diet and be very careful with how much processed and fast food they eat. Excessive sodium intake can result in fluid retention which can add additional strain to patients cardiac system. Weight loss can also help long term with cardiac health. For patients with BMI above 25kg/m2, studies have shown a greater chance of progression of disease as well as recurrence after interventions. Smoking and vaping should be stopped. Moderation on caffeine and alcohol. Excessive alcohol or caffeine can result in reactions and antagonize the heart system. Patient that fit the matrix for sleep apnea should be referred for a formal study and should try to be compliant with treatment for sleep apnea. Stay hydrated and stay active. Studies have shown that staying physically active can help heart health. Exercise goals should be trying to maintain 120-200 minutes per week of exercise.      A total of 44 minutes of time was spent on day of encounter reviewing medical record, performing history and examination, counseling, ordering medication/test/consults, collaborating with referring service, and documentation.      I spent 5 minutes of face to face  counseling on the vital need for smoking cessation.  We discussed pharmacotherapy measures for quiting including nicotine replacement.      Addison Patten AGACNP-EP    Cardiac Electrophysiology

## 2023-12-30 ENCOUNTER — PATIENT MESSAGE (OUTPATIENT)
Dept: MEDICAL GROUP | Facility: MEDICAL CENTER | Age: 48
End: 2023-12-30
Payer: MEDICARE

## 2023-12-30 DIAGNOSIS — E11.9 TYPE 2 DIABETES MELLITUS WITHOUT COMPLICATION, WITHOUT LONG-TERM CURRENT USE OF INSULIN (HCC): ICD-10-CM

## 2024-01-02 RX ORDER — INSULIN GLARGINE 100 [IU]/ML
10 INJECTION, SOLUTION SUBCUTANEOUS EVERY EVENING
Qty: 15 ML | Refills: 1 | Status: SHIPPED | OUTPATIENT
Start: 2024-01-02 | End: 2024-02-07 | Stop reason: SDUPTHER

## 2024-02-07 ENCOUNTER — OFFICE VISIT (OUTPATIENT)
Dept: MEDICAL GROUP | Facility: MEDICAL CENTER | Age: 49
End: 2024-02-07
Payer: MEDICARE

## 2024-02-07 ENCOUNTER — HOSPITAL ENCOUNTER (OUTPATIENT)
Facility: MEDICAL CENTER | Age: 49
End: 2024-02-07
Attending: NURSE PRACTITIONER
Payer: MEDICARE

## 2024-02-07 VITALS
HEIGHT: 74 IN | HEART RATE: 86 BPM | OXYGEN SATURATION: 96 % | DIASTOLIC BLOOD PRESSURE: 76 MMHG | RESPIRATION RATE: 20 BRPM | WEIGHT: 251.6 LBS | TEMPERATURE: 97.9 F | BODY MASS INDEX: 32.29 KG/M2 | SYSTOLIC BLOOD PRESSURE: 118 MMHG

## 2024-02-07 DIAGNOSIS — Z11.59 NEED FOR HEPATITIS C SCREENING TEST: ICD-10-CM

## 2024-02-07 DIAGNOSIS — Z11.4 SCREENING FOR HIV (HUMAN IMMUNODEFICIENCY VIRUS): ICD-10-CM

## 2024-02-07 DIAGNOSIS — F17.200 CURRENT EVERY DAY SMOKER: ICD-10-CM

## 2024-02-07 DIAGNOSIS — F32.9 MAJOR DEPRESSIVE DISORDER, REMISSION STATUS UNSPECIFIED, UNSPECIFIED WHETHER RECURRENT: ICD-10-CM

## 2024-02-07 DIAGNOSIS — E11.9 TYPE 2 DIABETES MELLITUS WITHOUT COMPLICATION, WITHOUT LONG-TERM CURRENT USE OF INSULIN (HCC): ICD-10-CM

## 2024-02-07 DIAGNOSIS — Z12.11 SCREEN FOR COLON CANCER: ICD-10-CM

## 2024-02-07 LAB
AMBIGUOUS DTTM AMBI4: NORMAL
CREAT UR-MCNC: 35.53 MG/DL
HBA1C MFR BLD: 9.2 % (ref ?–5.8)
MICROALBUMIN UR-MCNC: <1.2 MG/DL
MICROALBUMIN/CREAT UR: NORMAL MG/G (ref 0–30)
POCT INT CON NEG: NEGATIVE
POCT INT CON POS: POSITIVE

## 2024-02-07 PROCEDURE — 83036 HEMOGLOBIN GLYCOSYLATED A1C: CPT | Performed by: NURSE PRACTITIONER

## 2024-02-07 PROCEDURE — 82570 ASSAY OF URINE CREATININE: CPT

## 2024-02-07 PROCEDURE — 99214 OFFICE O/P EST MOD 30 MIN: CPT | Performed by: NURSE PRACTITIONER

## 2024-02-07 PROCEDURE — 3078F DIAST BP <80 MM HG: CPT | Performed by: NURSE PRACTITIONER

## 2024-02-07 PROCEDURE — 82043 UR ALBUMIN QUANTITATIVE: CPT

## 2024-02-07 PROCEDURE — 3074F SYST BP LT 130 MM HG: CPT | Performed by: NURSE PRACTITIONER

## 2024-02-07 RX ORDER — INSULIN GLARGINE 100 [IU]/ML
15 INJECTION, SOLUTION SUBCUTANEOUS EVERY EVENING
Qty: 15 ML | Refills: 1 | Status: SHIPPED | OUTPATIENT
Start: 2024-02-07

## 2024-02-07 RX ORDER — VARENICLINE TARTRATE 1 MG/1
1 TABLET, FILM COATED ORAL 2 TIMES DAILY
Qty: 60 TABLET | Refills: 3 | Status: SHIPPED | OUTPATIENT
Start: 2024-02-07

## 2024-02-07 RX ORDER — BUPROPION HYDROCHLORIDE 150 MG/1
150 TABLET ORAL EVERY MORNING
Qty: 60 TABLET | Refills: 1 | Status: SHIPPED | OUTPATIENT
Start: 2024-02-07

## 2024-02-07 RX ORDER — METFORMIN HYDROCHLORIDE 500 MG/1
1000 TABLET, EXTENDED RELEASE ORAL 2 TIMES DAILY
Qty: 360 TABLET | Refills: 1 | Status: SHIPPED | OUTPATIENT
Start: 2024-02-07

## 2024-02-07 ASSESSMENT — PATIENT HEALTH QUESTIONNAIRE - PHQ9
5. POOR APPETITE OR OVEREATING: 2 - MORE THAN HALF THE DAYS
CLINICAL INTERPRETATION OF PHQ2 SCORE: 4
SUM OF ALL RESPONSES TO PHQ QUESTIONS 1-9: 15

## 2024-02-07 ASSESSMENT — FIBROSIS 4 INDEX: FIB4 SCORE: 5.63

## 2024-02-07 NOTE — ASSESSMENT & PLAN NOTE
Chronic intermittent condition. In the past was taking Wellbutrin, was helpful. Trying to quit smoking.

## 2024-02-07 NOTE — ASSESSMENT & PLAN NOTE
Chronic problem.  History of 45 pack years, trying to quit smoking.  Needs to continue Chantix.  Also would like to start on Wellbutrin as it worked for him in the past.  Will also contact smoking cessation program

## 2024-02-07 NOTE — PROGRESS NOTES
Chief Complaint   Patient presents with    Medication Refill     Metformin  Chantix    Follow-Up       HISTORY OF PRESENT ILLNESS: Patient is a 48 y.o. male, established patient who presents today to discuss medical problems as listed below:    Health Maintenance:  COMPLETED, declined vaccinations today.      Allergies: Patient has no known allergies.    Current Outpatient Medications   Medication Sig Dispense Refill    buPROPion (WELLBUTRIN XL) 150 MG XL tablet Take 1 Tablet by mouth every morning. 60 Tablet 1    varenicline (CHANTIX CONTINUING MONTH MONICA) 1 MG tablet Take 1 Tablet by mouth 2 times a day. 60 Tablet 3    metFORMIN ER (GLUCOPHAGE XR) 500 MG TABLET SR 24 HR Take 2 Tablets by mouth 2 times a day. 360 Tablet 1    insulin glargine (LANTUS SOLOSTAR) 100 UNIT/ML Solution Pen-injector injection Inject 15 Units under the skin every evening. 15 mL 1    metFORMIN (GLUCOPHAGE) 500 MG Tab Take 1 Tablet by mouth 2 times a day with meals. 60 Tablet 1    carvedilol (COREG) 6.25 MG Tab Take 1 Tablet by mouth 2 times a day with meals. 180 Tablet 3    atorvastatin (LIPITOR) 80 MG tablet Take 1 Tablet by mouth every evening. 90 Tablet 3    aspirin 81 MG EC tablet Take 1 Tablet by mouth every day. 100 Tablet 3    lisinopril (PRINIVIL) 2.5 MG Tab Take 1 Tablet by mouth every day. 90 Tablet 3    prasugrel (EFFIENT) 10 MG Tab Take 1 Tablet by mouth every day. 90 Tablet 3    nitroglycerin (NITROSTAT) 0.4 MG SL Tab Place 1 Tablet under the tongue as needed for Chest Pain (for chest pain more than 10 minutes in duration). 25 Tablet 1    Insulin Pen Needle (PEN NEEDLES) 32G X 4 MM Misc USE WITH INSULIN  Each 3    Varenicline Tartrate, Starter, (CHANTIX STARTING MONTH MONICA) 0.5 MG X 11 & 1 MG X 42 Tablet Therapy Pack Days 1-3: 0.5 mg once daily. Days 4-7: 0.5 mg twice daily. Then 1 mg twice daily. (Patient not taking: Reported on 2/7/2024) 53 Each 1     No current facility-administered medications for this visit.  "      Allergies, past medical history, past surgical history, family history, social history reviewed and updated.    Review of Systems:     - Constitutional: Negative for fever, chills, unexpected weight change, and fatigue/generalized weakness.     - Respiratory: Negative for cough, sputum production, chest congestion, dyspnea, wheezing, and crackles.      - Cardiovascular: Negative for chest pain, palpitations, orthopnea, and bilateral lower extremity edema.     - Psychiatric/Behavioral: Negative for depression, suicidal/homicidal ideation and memory loss.      All other systems reviewed and are negative    Exam:    /76 (BP Location: Left arm, Patient Position: Sitting, BP Cuff Size: Adult long)   Pulse 86   Temp 36.6 °C (97.9 °F) (Temporal)   Resp 20   Ht 1.88 m (6' 2\")   Wt 114 kg (251 lb 9.6 oz)   SpO2 96%   BMI 32.30 kg/m²  Body mass index is 32.3 kg/m².    Physical Exam:  Constitutional: Well-developed and well-nourished. Not diaphoretic. No distress.   Cardiovascular: Regular rate and rhythm, S1 and S2 without murmur, rubs, or gallops.    Chest: Effort normal. Clear to auscultation throughout. No adventitious sounds. Neurological: Alert and oriented x 3.   Psychiatric:  Behavior, mood, and affect are appropriate.  MA/nursing note and vitals reviewed.    Assessment/Plan:  Type 2 diabetes mellitus without complication, without long-term current use of insulin (HCC)  Chronic condition. Taking Metformin 500 mg BID and lantus 10 units nightly.   Admits to neuropathy, used to take gabapentin, does not want rx at this time.   Monofilament testing with a 10 gram force: sensation intact: decreased bilaterally  Visual Inspection: Feet without maceration, ulcers, fissures.  Retina screen today.   Pedal pulses: decreased bilaterally   A1C today at 9.2    Major depressive disorder  Chronic intermittent condition. In the past was taking Wellbutrin, was helpful. Trying to quit smoking.     Current every day " smoker  Chronic problem.  History of 45 pack years, trying to quit smoking.  Needs to continue Chantix.  Also would like to start on Wellbutrin as it worked for him in the past.  Will also contact smoking cessation program    1. Type 2 diabetes mellitus without complication, without long-term current use of insulin (HCC)  Uncontrolled, improving.  Will increase metformin to 1000 mg twice daily as well as increase Lantus to 15 units nightly.  Will follow-up in 3 months.  - POCT Microalbumin Creat Ratio Urine; Future  - POCT  A1C  - POCT Retinal Eye Exam  - Referral to Podiatry  - metFORMIN ER (GLUCOPHAGE XR) 500 MG TABLET SR 24 HR; Take 2 Tablets by mouth 2 times a day.  Dispense: 360 Tablet; Refill: 1  - insulin glargine (LANTUS SOLOSTAR) 100 UNIT/ML Solution Pen-injector injection; Inject 15 Units under the skin every evening.  Dispense: 15 mL; Refill: 1    2. Current every day smoker  - REFERRAL TO LUNG CANCER SCREENING PROGRAM  - buPROPion (WELLBUTRIN XL) 150 MG XL tablet; Take 1 Tablet by mouth every morning.  Dispense: 60 Tablet; Refill: 1  - varenicline (CHANTIX CONTINUING MONTH MONICA) 1 MG tablet; Take 1 Tablet by mouth 2 times a day.  Dispense: 60 Tablet; Refill: 3    3. Screening for HIV (human immunodeficiency virus)  - HIV AG/AB COMBO ASSAY SCREENING; Future    4. Need for hepatitis C screening test  - HEP C VIRUS ANTIBODY; Future    5. Major depressive disorder, remission status unspecified, unspecified whether recurrent  Uncontrolled, stable.  Will restart Wellbutrin follow-up next visit.  - buPROPion (WELLBUTRIN XL) 150 MG XL tablet; Take 1 Tablet by mouth every morning.  Dispense: 60 Tablet; Refill: 1    6. Screen for colon cancer  - Referral to GI for Colonoscopy      Discussed with patient possible alternative diagnoses, patient is to take all medications as prescribed.      If symptoms persist FU w/PCP, if symptoms worsen go to emergency room.      If experiencing any side effects from prescribed  medications report to the office immediately or go to emergency room.     Reviewed indication, dosage, usage and potential adverse effects of prescribed medications.      Reviewed risks and benefits of treatment plan. Patient verbalizes understanding of all instruction and verbally agrees to plan.     Discussed plan with the patient, and patient agrees to the above.      I personally reviewed prior external notes and test results pertinent to today's visit.      No follow-ups on file. 3 mos

## 2024-02-07 NOTE — ASSESSMENT & PLAN NOTE
Chronic condition. Taking Metformin 500 mg BID and lantus 10 units nightly.   Admits to neuropathy, used to take gabapentin, does not want rx at this time.   Monofilament testing with a 10 gram force: sensation intact: decreased bilaterally  Visual Inspection: Feet without maceration, ulcers, fissures.  Retina screen today.   Pedal pulses: decreased bilaterally   A1C today at 9.2

## 2024-02-08 ENCOUNTER — TELEPHONE (OUTPATIENT)
Dept: HEALTH INFORMATION MANAGEMENT | Facility: OTHER | Age: 49
End: 2024-02-08

## 2024-02-09 NOTE — TELEPHONE ENCOUNTER
Outcome: Left Message    Please transfer to Patient Outreach Team at [phone] when patient returns call.    Attempt # 1

## 2024-02-15 NOTE — TELEPHONE ENCOUNTER
Outcome: Left Message    Please transfer to Patient Outreach Team at [phone] when patient returns call.    Attempt # 2

## 2024-03-19 ENCOUNTER — HOSPITAL ENCOUNTER (OUTPATIENT)
Dept: CARDIOLOGY | Facility: MEDICAL CENTER | Age: 49
End: 2024-03-19
Attending: NURSE PRACTITIONER
Payer: MEDICARE

## 2024-03-19 DIAGNOSIS — I25.118 CORONARY ARTERY DISEASE OF NATIVE ARTERY OF NATIVE HEART WITH STABLE ANGINA PECTORIS (HCC): ICD-10-CM

## 2024-03-19 DIAGNOSIS — R07.9 CHEST PAIN, UNSPECIFIED TYPE: ICD-10-CM

## 2024-03-19 LAB — LV EJECT FRACT  99904: 60

## 2024-03-19 PROCEDURE — 700117 HCHG RX CONTRAST REV CODE 255: Performed by: NURSE PRACTITIONER

## 2024-03-19 PROCEDURE — 93306 TTE W/DOPPLER COMPLETE: CPT | Mod: 26 | Performed by: INTERNAL MEDICINE

## 2024-03-19 PROCEDURE — 93306 TTE W/DOPPLER COMPLETE: CPT

## 2024-03-19 RX ADMIN — HUMAN ALBUMIN MICROSPHERES AND PERFLUTREN 3 ML: 10; .22 INJECTION, SOLUTION INTRAVENOUS at 12:30

## 2024-03-26 ENCOUNTER — OFFICE VISIT (OUTPATIENT)
Dept: CARDIOLOGY | Facility: MEDICAL CENTER | Age: 49
End: 2024-03-26
Attending: NURSE PRACTITIONER
Payer: MEDICARE

## 2024-03-26 VITALS
DIASTOLIC BLOOD PRESSURE: 76 MMHG | SYSTOLIC BLOOD PRESSURE: 120 MMHG | WEIGHT: 247 LBS | HEIGHT: 74 IN | OXYGEN SATURATION: 97 % | BODY MASS INDEX: 31.7 KG/M2 | HEART RATE: 90 BPM | RESPIRATION RATE: 16 BRPM

## 2024-03-26 DIAGNOSIS — F17.200 CURRENT EVERY DAY SMOKER: ICD-10-CM

## 2024-03-26 DIAGNOSIS — I10 PRIMARY HYPERTENSION: ICD-10-CM

## 2024-03-26 DIAGNOSIS — E78.2 MIXED HYPERLIPIDEMIA: Primary | ICD-10-CM

## 2024-03-26 DIAGNOSIS — E11.9 TYPE 2 DIABETES MELLITUS WITHOUT COMPLICATION, WITHOUT LONG-TERM CURRENT USE OF INSULIN (HCC): ICD-10-CM

## 2024-03-26 DIAGNOSIS — I25.118 CORONARY ARTERY DISEASE OF NATIVE ARTERY OF NATIVE HEART WITH STABLE ANGINA PECTORIS (HCC): ICD-10-CM

## 2024-03-26 DIAGNOSIS — G47.33 OBSTRUCTIVE SLEEP APNEA: ICD-10-CM

## 2024-03-26 DIAGNOSIS — R07.9 CHEST PAIN, UNSPECIFIED TYPE: ICD-10-CM

## 2024-03-26 LAB — EKG IMPRESSION: NORMAL

## 2024-03-26 PROCEDURE — 99214 OFFICE O/P EST MOD 30 MIN: CPT | Performed by: NURSE PRACTITIONER

## 2024-03-26 PROCEDURE — 93005 ELECTROCARDIOGRAM TRACING: CPT | Performed by: NURSE PRACTITIONER

## 2024-03-26 PROCEDURE — 3078F DIAST BP <80 MM HG: CPT | Performed by: NURSE PRACTITIONER

## 2024-03-26 PROCEDURE — 3074F SYST BP LT 130 MM HG: CPT | Performed by: NURSE PRACTITIONER

## 2024-03-26 PROCEDURE — 99213 OFFICE O/P EST LOW 20 MIN: CPT | Performed by: NURSE PRACTITIONER

## 2024-03-26 PROCEDURE — 93010 ELECTROCARDIOGRAM REPORT: CPT | Performed by: INTERNAL MEDICINE

## 2024-03-26 ASSESSMENT — ENCOUNTER SYMPTOMS
ORTHOPNEA: 0
EYES NEGATIVE: 1
DEPRESSION: 0
SENSORY CHANGE: 0
NEUROLOGICAL NEGATIVE: 1
DIZZINESS: 0
CLAUDICATION: 0
GASTROINTESTINAL NEGATIVE: 1
PND: 0
WHEEZING: 0
NAUSEA: 0
BRUISES/BLEEDS EASILY: 0
SHORTNESS OF BREATH: 0
RESPIRATORY NEGATIVE: 1
MUSCULOSKELETAL NEGATIVE: 1
HALLUCINATIONS: 0
CONSTITUTIONAL NEGATIVE: 1
PALPITATIONS: 0

## 2024-03-26 ASSESSMENT — FIBROSIS 4 INDEX: FIB4 SCORE: 5.63

## 2024-03-26 NOTE — PROGRESS NOTES
Cardiology Follow up Note    DOS: 3/26/2024   [id number]  Hal Vilchis    Chief complaint/Reason for consult: follow up PCI    HPI: Pt is a 48 y.o. male who presents to the clinic today in follow up for PCI and CAD. Patient has a past medical history significant for but not limited to: CAD S/P PCI to LCx, hypertension, ERICK, type 2 diabetes, mixed hyperlipidemia, tobacco abuse. Patient doing well, continues to work towards total cessation from tobacco. Down to quarter pack per day and using Chantix. BP good. Problems with last lab slip at [facility] to get lipid panel. Will take new order to lab shi. Tolerating medications well. Has some muscle aches with atorvastatin but does not want to change the medication at this time. Will follow up in December. Start increasing exercise. Some slight chest pains still, may need long acting nitrates for chronic angina. Exercise more and trial nitroglycerin if needed. Stop smoking altogether. Discussed echocardiogram which showed 3.4 dilation of aorta but otherwise normal echocardiogram.      History reviewed. No pertinent past medical history.    History reviewed. No pertinent surgical history.    Social History     Socioeconomic History    Marital status:      Spouse name: Not on file    Number of children: Not on file    Years of education: Not on file    Highest education level: Associate degree: occupational, technical, or vocational program   Occupational History    Not on file   Tobacco Use    Smoking status: Some Days     Current packs/day: 1.50     Average packs/day: 1.5 packs/day for 42.2 years (63.3 ttl pk-yrs)     Types: Cigarettes     Start date: 1982    Smokeless tobacco: Never   Vaping Use    Vaping Use: Never used   Substance and Sexual Activity    Alcohol use: Never    Drug use: Never    Sexual activity: Not on file   Other Topics Concern    Not on file   Social History Narrative    Not on file     Social Determinants of Health     Financial Resource  Strain: Medium Risk (12/11/2023)    Overall Financial Resource Strain (CARDIA)     Difficulty of Paying Living Expenses: Somewhat hard   Food Insecurity: No Food Insecurity (12/11/2023)    Hunger Vital Sign     Worried About Running Out of Food in the Last Year: Never true     Ran Out of Food in the Last Year: Never true   Transportation Needs: No Transportation Needs (12/11/2023)    PRAPARE - Transportation     Lack of Transportation (Medical): No     Lack of Transportation (Non-Medical): No   Physical Activity: Inactive (12/11/2023)    Exercise Vital Sign     Days of Exercise per Week: 0 days     Minutes of Exercise per Session: 0 min   Stress: Stress Concern Present (12/11/2023)    [employer] [place] [employer] - Occupational Stress Questionnaire     Feeling of Stress : Rather much   Social Connections: Moderately Isolated (12/11/2023)    Social Connection and Isolation Panel [NHANES]     Frequency of Communication with Friends and Family: Never     Frequency of Social Gatherings with Friends and Family: Once a week     Attends L[employer] day Services: More than 4 times per year     Active Member of Clubs or Organizations: No     Attends Club or Organization Meetings: Never     Marital Status:    Intimate Partner Violence: Not on file   Housing Stability: Unknown (12/11/2023)    Housing Stability Vital Sign     Unable to Pay for Housing in the Last Year: No     Number of Places Lived in the Last Year: Not on file     Unstable Housing in the Last Year: No       Family History   Problem Relation Age of Onset    Coronary artery disease Father     Heart Attack Father     Other Father         Bypass surgery    Coronary artery disease Paternal Grandfather     Heart Attack Paternal Grandfather     Other Paternal Grandfather         Bypass surgery       No Known Allergies    Current Outpatient Medications   Medication Sig Dispense Refill    buPROPion (WELLBUTRIN XL) 150 MG XL tablet Take 1 Tablet by mouth  every morning. 60 Tablet 1    varenicline (CHANTIX CONTINUING MONTH MONICA) 1 MG tablet Take 1 Tablet by mouth 2 times a day. 60 Tablet 3    metFORMIN ER (GLUCOPHAGE XR) 500 MG TABLET SR 24 HR Take 2 Tablets by mouth 2 times a day. 360 Tablet 1    insulin glargine (LANTUS SOLOSTAR) 100 UNIT/ML Solution Pen-injector injection Inject 15 Units under the skin every evening. 15 mL 1    carvedilol (COREG) 6.25 MG Tab Take 1 Tablet by mouth 2 times a day with meals. 180 Tablet 3    atorvastatin (LIPITOR) 80 MG tablet Take 1 Tablet by mouth every evening. 90 Tablet 3    aspirin 81 MG EC tablet Take 1 Tablet by mouth every day. 100 Tablet 3    lisinopril (PRINIVIL) 2.5 MG Tab Take 1 Tablet by mouth every day. 90 Tablet 3    prasugrel (EFFIENT) 10 MG Tab Take 1 Tablet by mouth every day. 90 Tablet 3    nitroglycerin (NITROSTAT) 0.4 MG SL Tab Place 1 Tablet under the tongue as needed for Chest Pain (for chest pain more than 10 minutes in duration). 25 Tablet 1    Insulin Pen Needle (PEN NEEDLES) 32G X 4 MM Misc USE WITH INSULIN  Each 3     No current facility-administered medications for this visit.       Vitals:    03/26/24 0927   BP: 120/76   Pulse: 90   Resp: 16   SpO2: 97%           Review of Systems   Constitutional: Negative.  Negative for malaise/fatigue.   HENT: Negative.     Eyes: Negative.    Respiratory: Negative.  Negative for shortness of breath and wheezing.    Cardiovascular:  Negative for chest pain, palpitations, orthopnea, claudication, leg swelling and PND.   Gastrointestinal: Negative.  Negative for nausea.   Genitourinary: Negative.    Musculoskeletal: Negative.    Skin: Negative.    Neurological: Negative.  Negative for dizziness and sensory change.   Endo/Heme/Allergies: Negative.  Does not bruise/bleed easily.   Psychiatric/Behavioral:  Negative for depression and hallucinations.         Prior echo/stress results reviewed: LVEF 50-55%, 3.4cm dilation of ascending aorta; normal valve function      Prior cath results reviewed:   Left main: normal  Left anterior descendin% stenosis in the proximal portion.  1 major diagonal branch is noted.  Distal LAD has diffuse luminal irregularities.  Left circumflex: First marginal branch comes off in the proximal portion, it has focal 60% stenosis in the proximal portion.  Distal circumflex artery is occluded, distally supplies large second marginal branch.  Right coronary: Diffuse 50% stenosis in the mid to distal portion.  Right posterior descending artery has focal 90% stenosis in the distal portion, small vessel at this location., dominant      Interventions:  IVUS guided angioplasty and placement of a 3.5 by 16mm Synergy drug-eluting stent in distal  left circumflex coronary artery.    EKG interpreted by me: Sinus rhythm     Physical Exam  Constitutional:       Appearance: Normal appearance.   HENT:      Head: Normocephalic.   Eyes:      Pupils: Pupils are equal, round, and reactive to light.   Neck:      Vascular: No JVD.   Cardiovascular:      Rate and Rhythm: Normal rate and regular rhythm.      Pulses: Normal pulses.      Heart sounds: Normal heart sounds.   Pulmonary:      Effort: Pulmonary effort is normal.      Breath sounds: Normal breath sounds.   Abdominal:      General: Abdomen is flat.      Palpations: Abdomen is soft.   Musculoskeletal:      Cervical back: Normal range of motion.      Right lower leg: No edema.      Left lower leg: No edema.   Skin:     General: Skin is warm and dry.   Neurological:      Mental Status: He is alert and oriented to person, place, and time.   Psychiatric:         Mood and Affect: Mood normal.         Behavior: Behavior normal.          Data:  Lipids:   Lab Results   Component Value Date/Time    CHOLSTRLTOT 228 (H) 2023 12:55 AM    TRIGLYCERIDE 366 (H) 2023 12:55 AM    HDL 24 (A) 2023 12:55 AM     (H) 2023 12:55 AM        BMP:  Lab Results   Component Value Date/Time    SODIUM 136  "12/03/2023 0055    POTASSIUM 3.8 12/03/2023 0055    CHLORIDE 103 12/03/2023 0055    CO2 20 12/03/2023 0055    GLUCOSE 231 (H) 12/03/2023 0055    BUN 9 12/03/2023 0055    CREATININE 0.70 12/03/2023 0055    CALCIUM 8.6 12/03/2023 0055    ANION 13.0 12/03/2023 0055       GFR:  No results found for: \"IFAFRICA\", \"IFNOTAFR\"     TSH:   No results found for: \"TSHULTRASEN\"    MAGNESIUM:  Lab Results   Component Value Date/Time    MAGNESIUM 2.1 12/03/2023 0055        THYROXINE (T4):   No results found for: \"FREEDIR\"     CBC:   Lab Results   Component Value Date/Time    WBC 9.8 12/03/2023 12:55 AM    RBC 4.85 12/03/2023 12:55 AM    HEMOGLOBIN 15.2 12/03/2023 12:55 AM    HEMATOCRIT 43.7 12/03/2023 12:55 AM    MCV 90.1 12/03/2023 12:55 AM    MCH 31.3 12/03/2023 12:55 AM    MCHC 34.8 12/03/2023 12:55 AM    RDW 41.5 12/03/2023 12:55 AM    PLATELETCT 237 12/03/2023 12:55 AM    MPV 10.4 12/03/2023 12:55 AM    NEUTSPOLYS 54.50 12/02/2023 05:58 AM    LYMPHOCYTES 35.40 12/02/2023 05:58 AM    MONOCYTES 5.70 12/02/2023 05:58 AM    EOSINOPHILS 3.00 12/02/2023 05:58 AM    BASOPHILS 1.10 12/02/2023 05:58 AM    IMMGRAN 0.30 12/02/2023 05:58 AM    NRBC 0.00 12/02/2023 05:58 AM    NEUTS 4.32 12/02/2023 05:58 AM    LYMPHS 2.81 12/02/2023 05:58 AM    MONOS 0.45 12/02/2023 05:58 AM    EOS 0.24 12/02/2023 05:58 AM    BASO 0.09 12/02/2023 05:58 AM    IMMGRANAB 0.02 12/02/2023 05:58 AM    NRBCAB 0.00 12/02/2023 05:58 AM        CBC w/o DIFF  Lab Results   Component Value Date/Time    WBC 9.8 12/03/2023 12:55 AM    RBC 4.85 12/03/2023 12:55 AM    HEMOGLOBIN 15.2 12/03/2023 12:55 AM    MCV 90.1 12/03/2023 12:55 AM    MCH 31.3 12/03/2023 12:55 AM    MCHC 34.8 12/03/2023 12:55 AM    RDW 41.5 12/03/2023 12:55 AM    MPV 10.4 12/03/2023 12:55 AM       LIVER:  Lab Results   Component Value Date/Time    ALKPHOSPHAT 56 12/03/2023 12:55 AM    ASTSGOT 173 (H) 12/03/2023 12:55 AM    ALTSGPT 42 12/03/2023 12:55 AM    TBILIRUBIN 0.6 12/03/2023 12:55 AM " "      BNP:  No results found for: \"BNPBTYPENAT\"    PT/INR:  Lab Results   Component Value Date/Time    PROTHROMBTM 14.0 12/02/2023 05:58 AM    INR 1.07 12/02/2023 05:58 AM             Impression/Plan:  Obstructive sleep apnea   - wearing CPAP   - having difficulties with [facility] at this time    - working with medicare to help    Coronary artery disease of native artery of native heart with stable angina pectoris (HCC)   - continue to work towards total smoking cessation   - dual antiplatelet therapy for rest of year   - monotherapy long term with aspirin   - BP and LDL goals in accompanying sections   - start exercising more   - trial nitroglycerin for possible chronic anginal pain   - may need long acting Imdur therapy as well    - Return in Dec.    Primary hypertension   - well controlled    - continue carvedilol and lisinopril   - goal less than 130/80    Type 2 diabetes mellitus without complication, without long-term current use of insulin (HCC)   - make sure to follow up with PCP about diabetes control   - last glycohemoglobin high   - discussed effects of uncontrolled diabetes on heart and kidneys       Mixed hyperlipidemia   - repeat lipid panel   - goal LDL less than 70    - continue current atorvastatin dose   - if muscle aches continue consider change to rosuvastatin or lower dose and add Zetia      Current every day smoker   - continue to reduce and using Chantix   - goal total cessation        A total of 36 minutes of time was spent on day of encounter reviewing medical record, performing history and examination, counseling, ordering medication/test/consults, collaborating with referring service, and documentation.      I spent 5 minutes of face to face counseling on the vital need for smoking cessation.  We discussed pharmacotherapy measures for quiting including nicotine replacement.      Addison Patten [facility] HospitalCNP-EP    Cardiac Electrophysiology    "

## 2024-03-26 NOTE — ASSESSMENT & PLAN NOTE
- wearing CPAP   - having difficulties with [facility] at this time    - working with medicare to help

## 2024-03-26 NOTE — ASSESSMENT & PLAN NOTE
- repeat lipid panel   - goal LDL less than 70    - continue current atorvastatin dose   - if muscle aches continue consider change to rosuvastatin or lower dose and add Zetia

## 2024-03-26 NOTE — ASSESSMENT & PLAN NOTE
- continue to work towards total smoking cessation   - dual antiplatelet therapy for rest of year   - monotherapy long term with aspirin   - BP and LDL goals in accompanying sections   - start exercising more   - trial nitroglycerin for possible chronic anginal pain   - may need long acting Imdur therapy as well    - Return in Dec.

## 2024-03-26 NOTE — ASSESSMENT & PLAN NOTE
- make sure to follow up with PCP about diabetes control   - last glycohemoglobin high   - discussed effects of uncontrolled diabetes on heart and kidneys

## 2024-05-07 ENCOUNTER — OFFICE VISIT (OUTPATIENT)
Dept: MEDICAL GROUP | Facility: MEDICAL CENTER | Age: 49
End: 2024-05-07
Payer: MEDICARE

## 2024-05-07 VITALS
OXYGEN SATURATION: 98 % | HEIGHT: 74 IN | RESPIRATION RATE: 18 BRPM | TEMPERATURE: 97.7 F | HEART RATE: 78 BPM | DIASTOLIC BLOOD PRESSURE: 78 MMHG | SYSTOLIC BLOOD PRESSURE: 122 MMHG | BODY MASS INDEX: 32.11 KG/M2 | WEIGHT: 250.2 LBS

## 2024-05-07 DIAGNOSIS — F17.200 CURRENT EVERY DAY SMOKER: ICD-10-CM

## 2024-05-07 DIAGNOSIS — E78.2 MIXED HYPERLIPIDEMIA: ICD-10-CM

## 2024-05-07 DIAGNOSIS — Z12.11 SCREEN FOR COLON CANCER: ICD-10-CM

## 2024-05-07 DIAGNOSIS — F32.9 MAJOR DEPRESSIVE DISORDER, REMISSION STATUS UNSPECIFIED, UNSPECIFIED WHETHER RECURRENT: ICD-10-CM

## 2024-05-07 DIAGNOSIS — E11.9 TYPE 2 DIABETES MELLITUS WITHOUT COMPLICATION, WITHOUT LONG-TERM CURRENT USE OF INSULIN (HCC): ICD-10-CM

## 2024-05-07 DIAGNOSIS — I10 PRIMARY HYPERTENSION: ICD-10-CM

## 2024-05-07 DIAGNOSIS — Z12.5 SCREENING FOR PROSTATE CANCER: ICD-10-CM

## 2024-05-07 DIAGNOSIS — I24.9 ACUTE CORONARY SYNDROMES (HCC): ICD-10-CM

## 2024-05-07 LAB
HBA1C MFR BLD: 8.4 % (ref ?–5.8)
POCT INT CON NEG: NEGATIVE
POCT INT CON POS: POSITIVE

## 2024-05-07 PROCEDURE — 3078F DIAST BP <80 MM HG: CPT | Performed by: NURSE PRACTITIONER

## 2024-05-07 PROCEDURE — 99214 OFFICE O/P EST MOD 30 MIN: CPT | Performed by: NURSE PRACTITIONER

## 2024-05-07 PROCEDURE — 83036 HEMOGLOBIN GLYCOSYLATED A1C: CPT | Performed by: NURSE PRACTITIONER

## 2024-05-07 PROCEDURE — 3074F SYST BP LT 130 MM HG: CPT | Performed by: NURSE PRACTITIONER

## 2024-05-07 RX ORDER — BUPROPION HYDROCHLORIDE 150 MG/1
300 TABLET ORAL EVERY MORNING
Qty: 180 TABLET | Refills: 1 | Status: SHIPPED | OUTPATIENT
Start: 2024-05-07

## 2024-05-07 RX ORDER — INSULIN GLARGINE 100 [IU]/ML
15 INJECTION, SOLUTION SUBCUTANEOUS EVERY EVENING
Qty: 20 ML | Refills: 1 | Status: SHIPPED | OUTPATIENT
Start: 2024-05-07

## 2024-05-07 ASSESSMENT — FIBROSIS 4 INDEX: FIB4 SCORE: 5.63

## 2024-05-07 ASSESSMENT — ENCOUNTER SYMPTOMS
CHILLS: 0
PALPITATIONS: 0
FEVER: 0

## 2024-05-07 NOTE — PROGRESS NOTES
Patient agreed to use of JOHN: yes  Chief Complaint   Patient presents with    Medication Follow-up     2  HISTORY OF PRESENT ILLNESS: Patient is a pleasant 48 y.o. male, established patient who presents today to discuss medical problems as listed below:    Assessment/Plan:    Hal was seen today for medication follow-up.    Diagnoses and all orders for this visit:    Type 2 diabetes mellitus without complication, without long-term current use of insulin (HCC)  -     POCT  A1C  -     insulin glargine (LANTUS SOLOSTAR) 100 UNIT/ML Solution Pen-injector injection; Inject 15 Units under the skin every evening.  -     Comp Metabolic Panel; Future  -     HEMOGLOBIN A1C; Future    Major depressive disorder, remission status unspecified, unspecified whether recurrent  -     buPROPion (WELLBUTRIN XL) 150 MG XL tablet; Take 2 Tablets by mouth every morning.    Current every day smoker  -     buPROPion (WELLBUTRIN XL) 150 MG XL tablet; Take 2 Tablets by mouth every morning.    Acute coronary syndromes (HCC)    Screen for colon cancer  -     COLOGUARD (FIT DNA)    Screening for prostate cancer  -     PROSTATE SPECIFIC AG SCREENING; Future              Assessment & Plan  1. Type 2 diabetes.  The patient's condition is chronic , uncontrolled however improving. He is currently on a regimen of nocturnal Lantus, which was increased from 15 units to 20 units nightly, which he tolerates well. He denies experiencing hypoglycemia and is also taking metformin extended-release 1000 mg twice daily. An A1c test will be conducted today.    2. Depression.  The patient's depression is chronic but showing signs of improvement. The dosage of Wellbutrin extended-release 150 mg was initiated during the last visit, which has resulted in significant improvement. The dosage of Wellbutrin will be increased from 150 mg to 300 mg, and a follow-up appointment will be scheduled during the next visit.    3. Current everyday smoker.  The patient's  condition is chronic and stable. He is responding well to Chantix, which will be continued.    4. Hyperlipidemia.  This is a chronic and stable condition. The patient is under the care of a cardiologist and was initiated on atorvastatin 80 mg, which he tolerates well. Pending lab results.    5. Primary hypertension.  This condition is chronic and stable. It is currently well-managed with carvedilol, lisinopril, and carvedilol. He is under the care of a cardiologist.    History of Present Illness  The patient is a 48-year-old male who is here for follow-up of diabetes and depression. Lab work was done back in 12/2023.    The patient is not currently under the care of an endocrinologist. He transitioned from metformin 1000 mg twice daily to extended-release metformin 500 mg twice daily due to gastrointestinal discomfort from the 1000 mg dosage. His Lantus dosage was increased from 10 to 20 units due to persistent hyperglycemia. However, he admits to forgetting to take his evening dose and has not monitored his blood glucose levels in a few days. He has made dietary modifications, including a reduction in sugar and processed foods, since his diabetes diagnosis.    The patient reports an improvement in his depression symptoms since starting Wellbutrin 150 mg.    The patient's blood pressure is well-managed with carvedilol. He has recently consulted with a cardiologist and is scheduled for a follow-up visit in a few months. He denies experiencing chest pain, dyspnea, or dizziness. His physical activity is generally good, although he experiences occasional joint pain. His current medication regimen includes baby aspirin.    The patient was initiated on atorvastatin 80 mg by his cardiologist. Repeat labs have been ordered by his cardiologist.    The patient occasionally applies sunscreen when exposed to sunlight. He typically wears long-sleeved clothing and a hat.    The patient has reduced his smoking habit to 5  "cigarettes daily, with 2 cigarettes consumed yesterday due to bedridden. He reports that Chantix is effective in smoking cessation.   He denies any substance use. He is .    Health Maintenance:  COMPLETED     Allergies, past medical history, past surgical history, family history, social history reviewed and updated.    Review of Systems   Constitutional:  Negative for chills, fever and malaise/fatigue.   Cardiovascular:  Negative for chest pain, palpitations and leg swelling.        Exam:    /78 (BP Location: Left arm, Patient Position: Sitting, BP Cuff Size: Large adult)   Pulse 78   Temp 36.5 °C (97.7 °F) (Temporal)   Resp 18   Ht 1.88 m (6' 2\")   Wt 113 kg (250 lb 3.2 oz)   SpO2 98%   BMI 32.12 kg/m²  Body mass index is 32.12 kg/m².    Physical Exam  Constitutional:       General: He is not in acute distress.     Appearance: He is not ill-appearing.   HENT:      Head: Normocephalic and atraumatic.      Nose: Nose normal.   Eyes:      General:         Right eye: No discharge.         Left eye: No discharge.   Cardiovascular:      Rate and Rhythm: Normal rate.      Pulses: Normal pulses.      Heart sounds: Normal heart sounds. No murmur heard.  Pulmonary:      Effort: Pulmonary effort is normal. No respiratory distress.      Breath sounds: Normal breath sounds. No stridor. No wheezing or rales.   Skin:     Findings: No rash.   Neurological:      General: No focal deficit present.      Mental Status: He is alert. Mental status is at baseline.   Psychiatric:         Mood and Affect: Mood normal.         Behavior: Behavior normal.          Results  Laboratory Studies  A1c is 27.3, 7.4, and 5.7.       Discussed with patient possible alternative diagnoses, patient is to take all medications as prescribed.      If symptoms persist FU w/PCP, if symptoms worsen go to emergency room.      If experiencing any side effects from prescribed medications report to the office immediately or go to emergency " room.     Reviewed indication, dosage, usage and potential adverse effects of prescribed medications.      Reviewed risks and benefits of treatment plan. Patient verbalizes understanding of all instruction and verbally agrees to plan.     Discussed plan with the patient, and patient agrees to the above.      I personally reviewed prior external notes and test results pertinent to today's visit.      No follow-ups on file. 3mos

## 2024-07-01 DIAGNOSIS — F17.200 CURRENT EVERY DAY SMOKER: ICD-10-CM

## 2024-07-02 RX ORDER — VARENICLINE TARTRATE 1 MG/1
1 TABLET, FILM COATED ORAL 2 TIMES DAILY
Qty: 60 TABLET | Refills: 0 | Status: SHIPPED | OUTPATIENT
Start: 2024-07-02

## 2024-08-02 DIAGNOSIS — F17.200 CURRENT EVERY DAY SMOKER: ICD-10-CM

## 2024-08-02 RX ORDER — VARENICLINE TARTRATE 1 MG/1
1 TABLET, FILM COATED ORAL 2 TIMES DAILY
Qty: 60 TABLET | Refills: 0 | Status: SHIPPED | OUTPATIENT
Start: 2024-08-02 | End: 2024-08-21 | Stop reason: SDUPTHER

## 2024-08-02 NOTE — TELEPHONE ENCOUNTER
Received request via: Pharmacy    Was the patient seen in the last year in this department? Yes    Does the patient have an active prescription (recently filled or refills available) for medication(s) requested? NO    Pharmacy Name: [facility]    Does the patient have FPC Plus and need 100 day supply (blood pressure, diabetes and cholesterol meds only)? Patient does not have SCP

## 2024-08-05 DIAGNOSIS — E11.9 TYPE 2 DIABETES MELLITUS WITHOUT COMPLICATION, WITHOUT LONG-TERM CURRENT USE OF INSULIN (HCC): ICD-10-CM

## 2024-08-06 RX ORDER — METFORMIN HCL 500 MG
1000 TABLET, EXTENDED RELEASE 24 HR ORAL 2 TIMES DAILY
Qty: 360 TABLET | Refills: 0 | Status: SHIPPED | OUTPATIENT
Start: 2024-08-06

## 2024-08-20 SDOH — ECONOMIC STABILITY: FOOD INSECURITY: WITHIN THE PAST 12 MONTHS, THE FOOD YOU BOUGHT JUST DIDN'T LAST AND YOU DIDN'T HAVE MONEY TO GET MORE.: NEVER TRUE

## 2024-08-20 SDOH — ECONOMIC STABILITY: INCOME INSECURITY: IN THE LAST 12 MONTHS, WAS THERE A TIME WHEN YOU WERE NOT ABLE TO PAY THE MORTGAGE OR RENT ON TIME?: NO

## 2024-08-20 SDOH — ECONOMIC STABILITY: FOOD INSECURITY: WITHIN THE PAST 12 MONTHS, YOU WORRIED THAT YOUR FOOD WOULD RUN OUT BEFORE YOU GOT MONEY TO BUY MORE.: NEVER TRUE

## 2024-08-20 SDOH — HEALTH STABILITY: PHYSICAL HEALTH
ON AVERAGE, HOW MANY DAYS PER WEEK DO YOU ENGAGE IN MODERATE TO STRENUOUS EXERCISE (LIKE A BRISK WALK)?: PATIENT DECLINED

## 2024-08-20 SDOH — HEALTH STABILITY: MENTAL HEALTH
STRESS IS WHEN SOMEONE FEELS TENSE, NERVOUS, ANXIOUS, OR CAN'T SLEEP AT NIGHT BECAUSE THEIR MIND IS TROUBLED. HOW STRESSED ARE YOU?: TO SOME EXTENT

## 2024-08-20 SDOH — ECONOMIC STABILITY: INCOME INSECURITY: HOW HARD IS IT FOR YOU TO PAY FOR THE VERY BASICS LIKE FOOD, HOUSING, MEDICAL CARE, AND HEATING?: NOT VERY HARD

## 2024-08-20 SDOH — HEALTH STABILITY: PHYSICAL HEALTH: ON AVERAGE, HOW MANY MINUTES DO YOU ENGAGE IN EXERCISE AT THIS LEVEL?: PATIENT DECLINED

## 2024-08-20 SDOH — ECONOMIC STABILITY: TRANSPORTATION INSECURITY
IN THE PAST 12 MONTHS, HAS THE LACK OF TRANSPORTATION KEPT YOU FROM MEDICAL APPOINTMENTS OR FROM GETTING MEDICATIONS?: YES

## 2024-08-20 ASSESSMENT — SOCIAL DETERMINANTS OF HEALTH (SDOH)
HOW OFTEN DO YOU GET TOGETHER WITH FRIENDS OR RELATIVES?: THREE TIMES A WEEK
HOW OFTEN DO YOU ATTENT MEETINGS OF THE CLUB OR ORGANIZATION YOU BELONG TO?: MORE THAN 4 TIMES PER YEAR
HOW OFTEN DO YOU ATTENT MEETINGS OF THE CLUB OR ORGANIZATION YOU BELONG TO?: MORE THAN 4 TIMES PER YEAR
IN THE PAST 12 MONTHS, HAS THE ELECTRIC, GAS, OIL, OR WATER COMPANY THREATENED TO SHUT OFF SERVICE IN YOUR HOME?: NO
HOW OFTEN DO YOU HAVE A DRINK CONTAINING ALCOHOL: NEVER
DO YOU BELONG TO ANY CLUBS OR ORGANIZATIONS SUCH AS CHURCH GROUPS UNIONS, FRATERNAL OR ATHLETIC GROUPS, OR SCHOOL GROUPS?: YES
HOW OFTEN DO YOU GET TOGETHER WITH FRIENDS OR RELATIVES?: THREE TIMES A WEEK
DO YOU BELONG TO ANY CLUBS OR ORGANIZATIONS SUCH AS CHURCH GROUPS UNIONS, FRATERNAL OR ATHLETIC GROUPS, OR SCHOOL GROUPS?: YES
HOW MANY DRINKS CONTAINING ALCOHOL DO YOU HAVE ON A TYPICAL DAY WHEN YOU ARE DRINKING: PATIENT DOES NOT DRINK
HOW HARD IS IT FOR YOU TO PAY FOR THE VERY BASICS LIKE FOOD, HOUSING, MEDICAL CARE, AND HEATING?: NOT VERY HARD
IN A TYPICAL WEEK, HOW MANY TIMES DO YOU TALK ON THE PHONE WITH FAMILY, FRIENDS, OR NEIGHBORS?: ONCE A WEEK
WITHIN THE PAST 12 MONTHS, YOU WORRIED THAT YOUR FOOD WOULD RUN OUT BEFORE YOU GOT THE MONEY TO BUY MORE: NEVER TRUE
HOW OFTEN DO YOU ATTEND CHURCH OR RELIGIOUS SERVICES?: MORE THAN 4 TIMES PER YEAR
HOW OFTEN DO YOU HAVE SIX OR MORE DRINKS ON ONE OCCASION: NEVER
HOW OFTEN DO YOU ATTEND CHURCH OR RELIGIOUS SERVICES?: MORE THAN 4 TIMES PER YEAR
IN A TYPICAL WEEK, HOW MANY TIMES DO YOU TALK ON THE PHONE WITH FAMILY, FRIENDS, OR NEIGHBORS?: ONCE A WEEK

## 2024-08-20 ASSESSMENT — LIFESTYLE VARIABLES
HOW OFTEN DO YOU HAVE A DRINK CONTAINING ALCOHOL: NEVER
SKIP TO QUESTIONS 9-10: 1
HOW OFTEN DO YOU HAVE SIX OR MORE DRINKS ON ONE OCCASION: NEVER
HOW MANY STANDARD DRINKS CONTAINING ALCOHOL DO YOU HAVE ON A TYPICAL DAY: PATIENT DOES NOT DRINK
AUDIT-C TOTAL SCORE: 0

## 2024-08-21 ENCOUNTER — TELEPHONE (OUTPATIENT)
Dept: VASCULAR LAB | Facility: MEDICAL CENTER | Age: 49
End: 2024-08-21

## 2024-08-21 ENCOUNTER — OFFICE VISIT (OUTPATIENT)
Dept: MEDICAL GROUP | Facility: MEDICAL CENTER | Age: 49
End: 2024-08-21
Payer: MEDICARE

## 2024-08-21 VITALS
DIASTOLIC BLOOD PRESSURE: 74 MMHG | HEIGHT: 74 IN | SYSTOLIC BLOOD PRESSURE: 118 MMHG | HEART RATE: 78 BPM | TEMPERATURE: 97.8 F | WEIGHT: 249.12 LBS | BODY MASS INDEX: 31.97 KG/M2 | OXYGEN SATURATION: 97 %

## 2024-08-21 DIAGNOSIS — G89.29 CHRONIC RIGHT SHOULDER PAIN: ICD-10-CM

## 2024-08-21 DIAGNOSIS — E11.9 TYPE 2 DIABETES MELLITUS WITHOUT COMPLICATION, WITHOUT LONG-TERM CURRENT USE OF INSULIN (HCC): ICD-10-CM

## 2024-08-21 DIAGNOSIS — I10 PRIMARY HYPERTENSION: ICD-10-CM

## 2024-08-21 DIAGNOSIS — F17.200 CURRENT EVERY DAY SMOKER: ICD-10-CM

## 2024-08-21 DIAGNOSIS — Z00.00 PE (PHYSICAL EXAM), ANNUAL: ICD-10-CM

## 2024-08-21 DIAGNOSIS — Z12.11 SCREEN FOR COLON CANCER: ICD-10-CM

## 2024-08-21 DIAGNOSIS — M25.511 CHRONIC RIGHT SHOULDER PAIN: ICD-10-CM

## 2024-08-21 DIAGNOSIS — M25.561 RIGHT KNEE PAIN, UNSPECIFIED CHRONICITY: ICD-10-CM

## 2024-08-21 LAB
HBA1C MFR BLD: 8 % (ref ?–5.8)
POCT INT CON NEG: NEGATIVE
POCT INT CON POS: POSITIVE

## 2024-08-21 PROCEDURE — 83036 HEMOGLOBIN GLYCOSYLATED A1C: CPT | Performed by: NURSE PRACTITIONER

## 2024-08-21 PROCEDURE — 99214 OFFICE O/P EST MOD 30 MIN: CPT | Performed by: NURSE PRACTITIONER

## 2024-08-21 PROCEDURE — 3074F SYST BP LT 130 MM HG: CPT | Performed by: NURSE PRACTITIONER

## 2024-08-21 PROCEDURE — 3078F DIAST BP <80 MM HG: CPT | Performed by: NURSE PRACTITIONER

## 2024-08-21 RX ORDER — EMPAGLIFLOZIN 25 MG/1
1 TABLET, FILM COATED ORAL DAILY
Qty: 90 TABLET | Refills: 1 | Status: SHIPPED | OUTPATIENT
Start: 2024-08-21

## 2024-08-21 RX ORDER — INSULIN GLARGINE 100 [IU]/ML
25 INJECTION, SOLUTION SUBCUTANEOUS EVERY EVENING
Qty: 20 ML | Refills: 1 | Status: SHIPPED | OUTPATIENT
Start: 2024-08-21 | End: 2024-08-21

## 2024-08-21 RX ORDER — INSULIN GLARGINE 100 [IU]/ML
INJECTION, SOLUTION SUBCUTANEOUS
Qty: 45 ML | Refills: 1 | Status: SHIPPED | OUTPATIENT
Start: 2024-08-21 | End: 2024-08-23

## 2024-08-21 RX ORDER — INSULIN GLARGINE 100 [IU]/ML
INJECTION, SOLUTION SUBCUTANEOUS
Qty: 45 ML | Refills: 1 | Status: SHIPPED | OUTPATIENT
Start: 2024-08-21 | End: 2024-08-21

## 2024-08-21 RX ORDER — VARENICLINE TARTRATE 1 MG/1
1 TABLET, FILM COATED ORAL 2 TIMES DAILY
Qty: 180 TABLET | Refills: 1 | Status: SHIPPED | OUTPATIENT
Start: 2024-08-21

## 2024-08-21 ASSESSMENT — ENCOUNTER SYMPTOMS
FEVER: 0
CHILLS: 0
PALPITATIONS: 0

## 2024-08-21 ASSESSMENT — FIBROSIS 4 INDEX: FIB4 SCORE: 5.74

## 2024-08-21 NOTE — TELEPHONE ENCOUNTER
[facility]     Received diabetes referral from [facility] on 08/21/2024.     Called and spoke to patient. Initial visit scheduled on 09/23/2024 at 10:00.    1st attempt     Insurance: Medicare  PCP: [facility]  Locations to be seen: Any    If no response by 09/21/2024 OR 2 no shows/cancellations, will remove from referral list    Onel Becerril, PharmD  [facility]  Phone [phone]

## 2024-08-22 DIAGNOSIS — E11.9 TYPE 2 DIABETES MELLITUS WITHOUT COMPLICATION, WITHOUT LONG-TERM CURRENT USE OF INSULIN (HCC): ICD-10-CM

## 2024-08-22 NOTE — PROGRESS NOTES
Patient agreed to use of JOHN: yes  Chief Complaint   Patient presents with    Follow-Up       HISTORY OF PRESENT ILLNESS: Patient is a pleasant 49 y.o. male, established patient who presents today to discuss medical problems as listed below:    Assessment/Plan:    Hal was seen today for follow-up.    Diagnoses and all orders for this visit:    Type 2 diabetes mellitus without complication, without long-term current use of insulin (HCC)  -     POCT  A1C  -     Discontinue: insulin glargine (LANTUS SOLOSTAR) 100 UNIT/ML Solution Pen-injector injection; Inject 25 Units under the skin every evening.  -     Empagliflozin (JARDIANCE) 25 MG Tab; Take 1 Tablet by mouth every day.  -     Referral to Pharmacotherapy Service  -     Referral to Endocrinology  -     DAMIAN-65; Future  -     INSULIN AND C-PEPTIDE, SERUM  -     INTRINSIC FACTOR AB; Future  -     T-TRANSGLUTAMINASE (TTG) IGA; Future  -     ZNT8 ANTIBODIES    Screen for colon cancer  -     OCCULT BLOOD FECES IMMUNOASSAY (FIT); Future    Primary hypertension    Chronic right shoulder pain  -     Cancel: Referral to Orthopedics  -     DX-SHOULDER 2+ RIGHT; Future  -     Referral to Orthopedics    Right knee pain, unspecified chronicity  -     DX-KNEE 3 VIEWS Atraumatic Pain/Swelling/Deformity; Future  -     Referral to Orthopedics    Current every day smoker  -     varenicline (CHANTIX) 1 MG tablet; Take 1 Tablet by mouth 2 times a day.    PE (physical exam), annual  -     CBC WITHOUT DIFFERENTIAL; Future  -     Lipid Profile; Future  -     Comp Metabolic Panel; Future  -     VITAMIN D,25 HYDROXY (DEFICIENCY); Future              Assessment & Plan  1. Primary hypertension.  This is a chronic and stable problem. It is well controlled on carvedilol and lisinopril. Continue carvedilol and lisinopril.    2. Type 2 diabetes.  This is a chronic problem. It is uncontrolled with A1c today at 8. He is on Lantus 25 units nightly and metformin 1000 mg twice a day. Jardiance 10  mg daily will be added to his regimen. A referral to endocrinology and a diabetes pharmacist has been placed for further evaluation and management.    3. Current everyday smoker.  This is a chronic problem. He is on Chantix and is trying to decrease the amount he smokes. He was counseled on smoking cessation. A refill for Chantix has been provided.    4. Chronic right shoulder pain.  He has been followed by pain management. An x-ray of the right shoulder will be obtained. A referral to orthopedics has been placed for further evaluation.    5. Right knee pain.  This is a chronic problem. An x-ray of the right knee will be obtained. A referral to orthopedics has been placed for further evaluation.    6. Health Maintenance.  He has a colon cancer screening kit at home but has not yet completed it. He was reminded to complete the test this week.      History of Present Illness  The patient presents for evaluation of multiple medical concerns.    He is currently on a regimen of Lantus 25 units, which he occasionally forgets to take at night, but has shown improvement over the past 3 weeks. He reports no instances of hypoglycemia and his blood glucose levels have been stable, neither too low nor too high. He recently acquired a glucose meter but has not yet used it. His medication also includes metformin 1000 mg twice daily. He has made dietary changes, including reducing his intake of sweets. He has never consulted with a diabetes pharmacist. He was diagnosed with type 2 diabetes in his mid to late 30s, even though there is a family history of type 1 diabetes. He has not been evaluated for type 1 diabetes and has never seen an endocrinologist. He had hypoglycemia for most of his life until his type 2 diabetes diagnosis. He was previously on Januvia and metformin, and it took approximately 9 months for his blood sugar levels to decrease.    He has not yet completed his colon cancer screening due to recent stomach issues.  However, he plans to complete it this week as his stomach condition has improved.    His blood pressure has been well managed since undergoing the rotarip procedure. He is not experiencing any shortness of breath. He had an increase in chest pain following the procedure, but he is scheduled to see his cardiologist on 12/02/2024 and does not require any medication refills at this time.    He fractured his clavicle at age 15 and underwent reconstructive surgery in 2000. He has not sought medical attention for his shoulder since 2016 or 2017. He has connective tissue issues and atrophy in his shoulder due to a torn rotator cuff, which was reattached. He also had a bursa clean-up procedure. He was advised to have knee replacement surgery at age 23, but was told to wait until he was 38, then 48. He experiences intermittent issues with his knees, but they resolve on their own. He was seeing a pain management specialist prior to his heart attack, but has not returned since November 2023. He has undergone knee and shoulder ablation procedures and believes he may need them again. He has not taken pain medications since December 2023. He experiences more pain in his right knee than his left, but overall, his knees have been in good condition recently.    He has reduced his smoking to less than a quarter pack per day and is currently on Chantix, which he finds helpful. He also tried vaping without nicotine, but discontinued it after reading about its potential dangers.    SOCIAL HISTORY  The patient denies alcohol intake. He used to drink 2 beers a day for about 10 years and got pancreatitis and quit drinking since then.    FAMILY HISTORY  His mother and son have type 1 diabetes.    Health Maintenance:  COMPLETED     Allergies, past medical history, past surgical history, family history, social history reviewed and updated.    Review of Systems   Constitutional:  Negative for chills, fever and malaise/fatigue.  "  Cardiovascular:  Negative for chest pain, palpitations and leg swelling.   Musculoskeletal:  Positive for joint pain.        Exam:    /74   Pulse 78   Temp 36.6 °C (97.8 °F) (Temporal)   Ht 1.88 m (6' 2\")   Wt 113 kg (249 lb 1.9 oz)   SpO2 97%   BMI 31.99 kg/m²  Body mass index is 31.99 kg/m².    Physical Exam  Constitutional:       General: He is not in acute distress.     Appearance: He is not ill-appearing.   HENT:      Head: Normocephalic and atraumatic.      Nose: Nose normal.   Eyes:      General:         Right eye: No discharge.         Left eye: No discharge.   Cardiovascular:      Rate and Rhythm: Normal rate.      Pulses: Normal pulses.      Heart sounds: Normal heart sounds. No murmur heard.  Pulmonary:      Effort: Pulmonary effort is normal. No respiratory distress.      Breath sounds: Normal breath sounds. No stridor. No wheezing or rales.   Skin:     Findings: No rash.   Neurological:      General: No focal deficit present.      Mental Status: He is alert. Mental status is at baseline.   Psychiatric:         Mood and Affect: Mood normal.         Behavior: Behavior normal.          Results  Laboratory Studies  A1c is at 8.     Discussed with patient possible alternative diagnoses, patient is to take all medications as prescribed.      If symptoms persist FU w/PCP, if symptoms worsen go to emergency room.      If experiencing any side effects from prescribed medications report to the office immediately or go to emergency room.     Reviewed indication, dosage, usage and potential adverse effects of prescribed medications.      Reviewed risks and benefits of treatment plan. Patient verbalizes understanding of all instruction and verbally agrees to plan.     Discussed plan with the patient, and patient agrees to the above.      I personally reviewed prior external notes and test results pertinent to today's visit.      No follow-ups on file. 3 mos  "

## 2024-08-23 DIAGNOSIS — E11.9 TYPE 2 DIABETES MELLITUS WITHOUT COMPLICATION, WITHOUT LONG-TERM CURRENT USE OF INSULIN (HCC): ICD-10-CM

## 2024-08-23 RX ORDER — INSULIN GLARGINE-YFGN 100 [IU]/ML
INJECTION, SOLUTION SUBCUTANEOUS
Qty: 45 ML | Refills: 1 | Status: SHIPPED | OUTPATIENT
Start: 2024-08-23 | End: 2024-08-23

## 2024-08-23 RX ORDER — INSULIN GLARGINE-YFGN 100 [IU]/ML
INJECTION, SOLUTION SUBCUTANEOUS
Qty: 45 ML | Refills: 1 | Status: SHIPPED | OUTPATIENT
Start: 2024-08-23 | End: 2024-08-27

## 2024-08-24 DIAGNOSIS — E11.9 TYPE 2 DIABETES MELLITUS WITHOUT COMPLICATION, WITHOUT LONG-TERM CURRENT USE OF INSULIN (HCC): ICD-10-CM

## 2024-08-27 DIAGNOSIS — E11.9 TYPE 2 DIABETES MELLITUS WITHOUT COMPLICATION, WITHOUT LONG-TERM CURRENT USE OF INSULIN (HCC): ICD-10-CM

## 2024-08-27 RX ORDER — INSULIN GLARGINE-YFGN 100 [IU]/ML
INJECTION, SOLUTION SUBCUTANEOUS
Qty: 45 ML | Refills: 1 | Status: SHIPPED | OUTPATIENT
Start: 2024-08-27 | End: 2024-08-28

## 2024-08-28 RX ORDER — INSULIN GLARGINE-YFGN 100 [IU]/ML
INJECTION, SOLUTION SUBCUTANEOUS
Qty: 45 ML | Refills: 1 | Status: SHIPPED | OUTPATIENT
Start: 2024-08-28 | End: 2024-08-29

## 2024-08-29 DIAGNOSIS — E11.9 TYPE 2 DIABETES MELLITUS WITHOUT COMPLICATION, WITHOUT LONG-TERM CURRENT USE OF INSULIN (HCC): ICD-10-CM

## 2024-08-29 RX ORDER — INSULIN GLARGINE-YFGN 100 [IU]/ML
INJECTION, SOLUTION SUBCUTANEOUS
Qty: 45 ML | Refills: 1 | Status: SHIPPED | OUTPATIENT
Start: 2024-08-29

## 2024-09-23 ENCOUNTER — APPOINTMENT (OUTPATIENT)
Dept: VASCULAR LAB | Facility: MEDICAL CENTER | Age: 49
End: 2024-09-23
Attending: INTERNAL MEDICINE
Payer: MEDICARE

## 2024-10-24 ENCOUNTER — NON-PROVIDER VISIT (OUTPATIENT)
Dept: VASCULAR LAB | Facility: MEDICAL CENTER | Age: 49
End: 2024-10-24
Attending: INTERNAL MEDICINE
Payer: MEDICARE

## 2024-10-24 VITALS — HEART RATE: 78 BPM | SYSTOLIC BLOOD PRESSURE: 118 MMHG | DIASTOLIC BLOOD PRESSURE: 79 MMHG

## 2024-10-24 DIAGNOSIS — E11.9 TYPE 2 DIABETES MELLITUS WITHOUT COMPLICATION, WITHOUT LONG-TERM CURRENT USE OF INSULIN (HCC): ICD-10-CM

## 2024-10-24 PROCEDURE — 99213 OFFICE O/P EST LOW 20 MIN: CPT

## 2024-10-24 RX ORDER — ACYCLOVIR 400 MG/1
1 TABLET ORAL
Qty: 12 EACH | Refills: 4 | Status: SHIPPED | OUTPATIENT
Start: 2024-10-24

## 2024-11-02 DIAGNOSIS — F32.9 MAJOR DEPRESSIVE DISORDER, REMISSION STATUS UNSPECIFIED, UNSPECIFIED WHETHER RECURRENT: ICD-10-CM

## 2024-11-02 DIAGNOSIS — F17.200 CURRENT EVERY DAY SMOKER: ICD-10-CM

## 2024-11-02 DIAGNOSIS — E11.9 TYPE 2 DIABETES MELLITUS WITHOUT COMPLICATION, WITHOUT LONG-TERM CURRENT USE OF INSULIN (HCC): ICD-10-CM

## 2024-11-05 RX ORDER — BUPROPION HYDROCHLORIDE 150 MG/1
TABLET ORAL
Qty: 180 TABLET | Refills: 3 | Status: SHIPPED | OUTPATIENT
Start: 2024-11-05

## 2024-11-05 RX ORDER — METFORMIN HYDROCHLORIDE 500 MG/1
1000 TABLET, EXTENDED RELEASE ORAL 2 TIMES DAILY
Qty: 360 TABLET | Refills: 1 | Status: SHIPPED | OUTPATIENT
Start: 2024-11-05

## 2024-11-07 ENCOUNTER — TELEPHONE (OUTPATIENT)
Dept: VASCULAR LAB | Facility: MEDICAL CENTER | Age: 49
End: 2024-11-07
Payer: MEDICARE

## 2024-11-07 NOTE — TELEPHONE ENCOUNTER
Received New start PA request via [employer]  for Tirzepatide 2.5 MG/0.5ML Solution Pen-injector. (Quantity:6 mls, Day Supply:84)     Insurance: [employer] ([place])  Member ID:  [id number]  BIN: [id number]  PCN: [id number]  Group: [id number]     Ran Test claim via [place] & medication  pays for $4 .60 copay. Will offer [facility] pharmacy services and or release Rx to pharmacy on file per pt's request.     LISA Jones, PhT  Vascular Pharmacy Liaison (Rx Coordinator)  P: [phone]  11/7/2024 2:56 PM

## 2024-11-21 ENCOUNTER — APPOINTMENT (OUTPATIENT)
Dept: MEDICAL GROUP | Facility: PHYSICIAN GROUP | Age: 49
End: 2024-11-21
Payer: MEDICARE

## 2024-11-21 ENCOUNTER — APPOINTMENT (OUTPATIENT)
Dept: MEDICAL GROUP | Facility: MEDICAL CENTER | Age: 49
End: 2024-11-21
Payer: MEDICARE

## 2024-11-26 ENCOUNTER — TELEPHONE (OUTPATIENT)
Dept: CARDIOLOGY | Facility: MEDICAL CENTER | Age: 49
End: 2024-11-26
Payer: MEDICARE

## 2024-11-26 NOTE — TELEPHONE ENCOUNTER
Called pt in regards to lab work that was ordered at previous OV. LVM for call back to see if the labs were completed somewhere outside of [facility]. Pt has follow up appointment scheduled with Sandor  on 12.4.2024.

## 2024-12-04 ENCOUNTER — OFFICE VISIT (OUTPATIENT)
Dept: CARDIOLOGY | Facility: MEDICAL CENTER | Age: 49
End: 2024-12-04
Attending: NURSE PRACTITIONER
Payer: MEDICARE

## 2024-12-04 VITALS
WEIGHT: 248.4 LBS | HEIGHT: 74 IN | DIASTOLIC BLOOD PRESSURE: 80 MMHG | OXYGEN SATURATION: 97 % | RESPIRATION RATE: 16 BRPM | SYSTOLIC BLOOD PRESSURE: 100 MMHG | HEART RATE: 77 BPM | BODY MASS INDEX: 31.88 KG/M2

## 2024-12-04 DIAGNOSIS — I25.118 CORONARY ARTERY DISEASE OF NATIVE ARTERY OF NATIVE HEART WITH STABLE ANGINA PECTORIS (HCC): ICD-10-CM

## 2024-12-04 DIAGNOSIS — I24.9 ACUTE CORONARY SYNDROMES (HCC): ICD-10-CM

## 2024-12-04 DIAGNOSIS — E78.2 MIXED HYPERLIPIDEMIA: ICD-10-CM

## 2024-12-04 PROCEDURE — 99406 BEHAV CHNG SMOKING 3-10 MIN: CPT | Performed by: NURSE PRACTITIONER

## 2024-12-04 PROCEDURE — 99213 OFFICE O/P EST LOW 20 MIN: CPT | Mod: 25 | Performed by: NURSE PRACTITIONER

## 2024-12-04 PROCEDURE — 99213 OFFICE O/P EST LOW 20 MIN: CPT | Performed by: NURSE PRACTITIONER

## 2024-12-04 PROCEDURE — 93005 ELECTROCARDIOGRAM TRACING: CPT | Mod: TC | Performed by: NURSE PRACTITIONER

## 2024-12-04 PROCEDURE — 3079F DIAST BP 80-89 MM HG: CPT | Performed by: NURSE PRACTITIONER

## 2024-12-04 PROCEDURE — 3074F SYST BP LT 130 MM HG: CPT | Performed by: NURSE PRACTITIONER

## 2024-12-04 RX ORDER — ATORVASTATIN CALCIUM 80 MG/1
80 TABLET, FILM COATED ORAL EVERY EVENING
Qty: 90 TABLET | Refills: 3 | Status: SHIPPED | OUTPATIENT
Start: 2024-12-04

## 2024-12-04 RX ORDER — LISINOPRIL 2.5 MG/1
2.5 TABLET ORAL DAILY
Qty: 90 TABLET | Refills: 3 | Status: SHIPPED | OUTPATIENT
Start: 2024-12-04

## 2024-12-04 RX ORDER — CARVEDILOL 6.25 MG/1
6.25 TABLET ORAL 2 TIMES DAILY WITH MEALS
Qty: 180 TABLET | Refills: 3 | Status: SHIPPED | OUTPATIENT
Start: 2024-12-04

## 2024-12-04 ASSESSMENT — ENCOUNTER SYMPTOMS
WHEEZING: 0
CLAUDICATION: 0
RESPIRATORY NEGATIVE: 1
EYES NEGATIVE: 1
PND: 0
NAUSEA: 0
ORTHOPNEA: 0
HALLUCINATIONS: 0
GASTROINTESTINAL NEGATIVE: 1
CONSTITUTIONAL NEGATIVE: 1
PALPITATIONS: 0
BRUISES/BLEEDS EASILY: 0
DEPRESSION: 0
SENSORY CHANGE: 0
MUSCULOSKELETAL NEGATIVE: 1
SHORTNESS OF BREATH: 0
NEUROLOGICAL NEGATIVE: 1
DIZZINESS: 0

## 2024-12-04 ASSESSMENT — FIBROSIS 4 INDEX: FIB4 SCORE: 5.74

## 2024-12-04 NOTE — PROGRESS NOTES
Cardiology Follow up Note    DOS: 12/4/2024   [id number]  Hal Vilchis    Chief complaint/Reason for consult: follow up PCI    HPI: Pt is a 48 y.o. male who presents to the clinic today in follow up for PCI and CAD. Patient has a past medical history significant for but not limited to: CAD S/P PCI to LCx, hypertension, ERICK, type 2 diabetes, mixed hyperlipidemia, tobacco abuse. Patient doing well, continues to work towards total cessation from tobacco. Less than a quarter pack per day and using Chantix. BP good. Get lab work done. Stop effient. Continue all other meds. Has been walking more. EKG normal sinus. Blood pressure stable.       No past medical history on file.    No past surgical history on file.    Social History     Socioeconomic History    Marital status:      Spouse name: Not on file    Number of children: Not on file    Years of education: Not on file    Highest education level: Associate degree: occupational, technical, or vocational program   Occupational History    Not on file   Tobacco Use    Smoking status: Some Days     Current packs/day: 1.50     Average packs/day: 1.5 packs/day for 42.9 years (64.4 ttl pk-yrs)     Types: Cigarettes     Start date: 1982    Smokeless tobacco: Never   Vaping Use    Vaping status: Never Used   Substance and Sexual Activity    Alcohol use: Never    Drug use: Never    Sexual activity: Not on file   Other Topics Concern    Not on file   Social History Narrative    Not on file     Social Drivers of Health     Financial Resource Strain: Low Risk  (8/20/2024)    Overall Financial Resource Strain (CARDIA)     Difficulty of Paying Living Expenses: Not very hard   Food Insecurity: No Food Insecurity (8/20/2024)    Hunger Vital Sign     Worried About Running Out of Food in the Last Year: Never true     Ran Out of Food in the Last Year: Never true   Transportation Needs: Unmet Transportation Needs (8/20/2024)    PRAPARE - Transportation     Lack of  Transportation (Medical): Yes     Lack of Transportation (Non-Medical): No   Physical Activity: Patient Declined (8/20/2024)    Exercise Vital Sign     Days of Exercise per Week: Patient declined     Minutes of Exercise per Session: Patient declined   Stress: Stress Concern Present (8/20/2024)    [employer] [place] [employer] - Occupational Stress Questionnaire     Feeling of Stress : To some extent   Social Connections: Socially Integrated (8/20/2024)    Social Connection and Isolation Panel [NHANES]     Frequency of Communication with Friends and Family: Once a week     Frequency of Social Gatherings with Friends and Family: Three times a week     Attends Confucianist Services: More than 4 times per year     Active Member of Clubs or Organizations: Yes     Attends Club or Organization Meetings: More than 4 times per year     Marital Status:    Intimate Partner Violence: Not on file   Housing Stability: Low Risk  (8/20/2024)    Housing Stability Vital Sign     Unable to Pay for Housing in the Last Year: No     Number of Times Moved in the Last Year: 0     Homeless in the Last Year: No       Family History   Problem Relation Age of Onset    Coronary artery disease Father     Heart Attack Father     Other Father         Bypass surgery    Coronary artery disease Paternal Grandfather     Heart Attack Paternal Grandfather     Other Paternal Grandfather         Bypass surgery       No Known Allergies    Current Outpatient Medications   Medication Sig Dispense Refill    buPROPion (WELLBUTRIN XL) 150 MG XL tablet TAKE 2 TABLETS BY MOUTH IN THE MORNING 180 Tablet 3    metFORMIN ER (GLUCOPHAGE XR) 500 MG TABLET SR 24 HR Take 2 tablets by mouth twice daily 360 Tablet 1    Continuous Glucose Sensor (DEXCOM G7 SENSOR) Misc 1 Each every 10 days. This Rx has been ordered by a pharmacist working under a collaborative practice agreement. 12 Each 4    Tirzepatide 2.5 MG/0.5ML Solution Pen-injector Inject 2.5 mg under  the skin every 7 days. 99 mL 9    SEMGLEE, YFGN, 100 UNIT/ML Solution Pen-injector INJECT 25 UNITS SUBCUTANEOUSLY IN THE EVENING 45 mL 1    Empagliflozin (JARDIANCE) 25 MG Tab Take 1 Tablet by mouth every day. 90 Tablet 1    varenicline (CHANTIX) 1 MG tablet Take 1 Tablet by mouth 2 times a day. 180 Tablet 1    carvedilol (COREG) 6.25 MG Tab Take 1 Tablet by mouth 2 times a day with meals. 180 Tablet 3    atorvastatin (LIPITOR) 80 MG tablet Take 1 Tablet by mouth every evening. 90 Tablet 3    aspirin 81 MG EC tablet Take 1 Tablet by mouth every day. 100 Tablet 3    lisinopril (PRINIVIL) 2.5 MG Tab Take 1 Tablet by mouth every day. 90 Tablet 3    prasugrel (EFFIENT) 10 MG Tab Take 1 Tablet by mouth every day. 90 Tablet 3    nitroglycerin (NITROSTAT) 0.4 MG SL Tab Place 1 Tablet under the tongue as needed for Chest Pain (for chest pain more than 10 minutes in duration). 25 Tablet 1    Insulin Pen Needle (PEN NEEDLES) 32G X 4 MM Misc USE WITH INSULIN  Each 3     No current facility-administered medications for this visit.       Vitals:    24 1024   BP: (!) 0/0           Review of Systems   Constitutional: Negative.  Negative for malaise/fatigue.   HENT: Negative.     Eyes: Negative.    Respiratory: Negative.  Negative for shortness of breath and wheezing.    Cardiovascular:  Negative for chest pain, palpitations, orthopnea, claudication, leg swelling and PND.   Gastrointestinal: Negative.  Negative for nausea.   Genitourinary: Negative.    Musculoskeletal: Negative.    Skin: Negative.    Neurological: Negative.  Negative for dizziness and sensory change.   Endo/Heme/Allergies: Negative.  Does not bruise/bleed easily.   Psychiatric/Behavioral:  Negative for depression and hallucinations.         Prior echo/stress results reviewed: LVEF 50-55%, 3.4cm dilation of ascending aorta; normal valve function     Prior cath results reviewed:   Left main: normal  Left anterior descendin% stenosis in the proximal  portion.  1 major diagonal branch is noted.  Distal LAD has diffuse luminal irregularities.  Left circumflex: First marginal branch comes off in the proximal portion, it has focal 60% stenosis in the proximal portion.  Distal circumflex artery is occluded, distally supplies large second marginal branch.  Right coronary: Diffuse 50% stenosis in the mid to distal portion.  Right posterior descending artery has focal 90% stenosis in the distal portion, small vessel at this location., dominant      Interventions:  IVUS guided angioplasty and placement of a 3.5 by 16mm Synergy drug-eluting stent in distal  left circumflex coronary artery.    EKG interpreted by me: Sinus rhythm     Physical Exam  Constitutional:       Appearance: Normal appearance.   HENT:      Head: Normocephalic.   Eyes:      Pupils: Pupils are equal, round, and reactive to light.   Neck:      Vascular: No JVD.   Cardiovascular:      Rate and Rhythm: Normal rate and regular rhythm.      Pulses: Normal pulses.      Heart sounds: Normal heart sounds.   Pulmonary:      Effort: Pulmonary effort is normal.      Breath sounds: Normal breath sounds.   Abdominal:      General: Abdomen is flat.      Palpations: Abdomen is soft.   Musculoskeletal:      Cervical back: Normal range of motion.      Right lower leg: No edema.      Left lower leg: No edema.   Skin:     General: Skin is warm and dry.   Neurological:      Mental Status: He is alert and oriented to person, place, and time.   Psychiatric:         Mood and Affect: Mood normal.         Behavior: Behavior normal.        Data:  Lipids:   Lab Results   Component Value Date/Time    CHOLSTRLTOT 228 (H) 12/03/2023 12:55 AM    TRIGLYCERIDE 366 (H) 12/03/2023 12:55 AM    HDL 24 (A) 12/03/2023 12:55 AM     (H) 12/03/2023 12:55 AM        BMP:  Lab Results   Component Value Date/Time    SODIUM 136 12/03/2023 0055    POTASSIUM 3.8 12/03/2023 0055    CHLORIDE 103 12/03/2023 0055    CO2 20 12/03/2023 0055     "GLUCOSE 231 (H) 12/03/2023 0055    BUN 9 12/03/2023 0055    CREATININE 0.70 12/03/2023 0055    CALCIUM 8.6 12/03/2023 0055    ANION 13.0 12/03/2023 0055       GFR:  No results found for: \"IFAFRICA\", \"IFNOTAFR\"     TSH:   No results found for: \"TSHULTRASEN\"    MAGNESIUM:  Lab Results   Component Value Date/Time    MAGNESIUM 2.1 12/03/2023 0055        THYROXINE (T4):   No results found for: \"FREEDIR\"     CBC:   Lab Results   Component Value Date/Time    WBC 9.8 12/03/2023 12:55 AM    RBC 4.85 12/03/2023 12:55 AM    HEMOGLOBIN 15.2 12/03/2023 12:55 AM    HEMATOCRIT 43.7 12/03/2023 12:55 AM    MCV 90.1 12/03/2023 12:55 AM    MCH 31.3 12/03/2023 12:55 AM    MCHC 34.8 12/03/2023 12:55 AM    RDW 41.5 12/03/2023 12:55 AM    PLATELETCT 237 12/03/2023 12:55 AM    MPV 10.4 12/03/2023 12:55 AM    NEUTSPOLYS 54.50 12/02/2023 05:58 AM    LYMPHOCYTES 35.40 12/02/2023 05:58 AM    MONOCYTES 5.70 12/02/2023 05:58 AM    EOSINOPHILS 3.00 12/02/2023 05:58 AM    BASOPHILS 1.10 12/02/2023 05:58 AM    IMMGRAN 0.30 12/02/2023 05:58 AM    NRBC 0.00 12/02/2023 05:58 AM    NEUTS 4.32 12/02/2023 05:58 AM    LYMPHS 2.81 12/02/2023 05:58 AM    MONOS 0.45 12/02/2023 05:58 AM    EOS 0.24 12/02/2023 05:58 AM    BASO 0.09 12/02/2023 05:58 AM    IMMGRANAB 0.02 12/02/2023 05:58 AM    NRBCAB 0.00 12/02/2023 05:58 AM        CBC w/o DIFF  Lab Results   Component Value Date/Time    WBC 9.8 12/03/2023 12:55 AM    RBC 4.85 12/03/2023 12:55 AM    HEMOGLOBIN 15.2 12/03/2023 12:55 AM    MCV 90.1 12/03/2023 12:55 AM    MCH 31.3 12/03/2023 12:55 AM    MCHC 34.8 12/03/2023 12:55 AM    RDW 41.5 12/03/2023 12:55 AM    MPV 10.4 12/03/2023 12:55 AM       LIVER:  Lab Results   Component Value Date/Time    ALKPHOSPHAT 56 12/03/2023 12:55 AM    ASTSGOT 173 (H) 12/03/2023 12:55 AM    ALTSGPT 42 12/03/2023 12:55 AM    TBILIRUBIN 0.6 12/03/2023 12:55 AM       BNP:  No results found for: \"BNPBTYPENAT\"    PT/INR:  Lab Results   Component Value Date/Time    PROTHROMBTM 14.0 " 12/02/2023 05:58 AM    INR 1.07 12/02/2023 05:58 AM             Impression/Plan:  No problem-specific Assessment & Plan notes found for this encounter.         A total of 20 minutes of time was spent on day of encounter reviewing medical record, performing history and examination, counseling, ordering medication/test/consults, collaborating with referring service, and documentation.      I spent 5 minutes of face to face counseling on the vital need for smoking cessation.  We discussed pharmacotherapy measures for quiting including nicotine replacement.      Addison Patten AGACNP-EP    Cardiac Electrophysiology

## 2024-12-04 NOTE — ASSESSMENT & PLAN NOTE
- stop effient   - daily aspirin 81mg indefinitely   - continue lisinopril and carvedilol   - blood pressure stable   - goal less than 130/80   - annual visit

## 2024-12-13 LAB — EKG IMPRESSION: NORMAL

## 2024-12-13 PROCEDURE — 93010 ELECTROCARDIOGRAM REPORT: CPT | Performed by: STUDENT IN AN ORGANIZED HEALTH CARE EDUCATION/TRAINING PROGRAM

## 2025-01-02 ENCOUNTER — APPOINTMENT (OUTPATIENT)
Dept: MEDICAL GROUP | Facility: PHYSICIAN GROUP | Age: 50
End: 2025-01-02
Payer: MEDICARE

## 2025-01-24 ENCOUNTER — HOSPITAL ENCOUNTER (OUTPATIENT)
Facility: MEDICAL CENTER | Age: 50
End: 2025-01-24
Attending: NURSE PRACTITIONER
Payer: MEDICARE

## 2025-01-24 ENCOUNTER — APPOINTMENT (OUTPATIENT)
Dept: MEDICAL GROUP | Facility: MEDICAL CENTER | Age: 50
End: 2025-01-24
Payer: MEDICARE

## 2025-01-24 VITALS
SYSTOLIC BLOOD PRESSURE: 110 MMHG | WEIGHT: 250.4 LBS | HEART RATE: 80 BPM | RESPIRATION RATE: 18 BRPM | TEMPERATURE: 98.2 F | DIASTOLIC BLOOD PRESSURE: 76 MMHG | HEIGHT: 73 IN | OXYGEN SATURATION: 96 % | BODY MASS INDEX: 33.19 KG/M2

## 2025-01-24 DIAGNOSIS — E11.9 TYPE 2 DIABETES MELLITUS WITHOUT COMPLICATION, WITHOUT LONG-TERM CURRENT USE OF INSULIN (HCC): ICD-10-CM

## 2025-01-24 DIAGNOSIS — G89.29 CHRONIC PAIN OF RIGHT KNEE: ICD-10-CM

## 2025-01-24 DIAGNOSIS — M25.561 CHRONIC PAIN OF RIGHT KNEE: ICD-10-CM

## 2025-01-24 DIAGNOSIS — M25.562 LEFT KNEE PAIN, UNSPECIFIED CHRONICITY: ICD-10-CM

## 2025-01-24 DIAGNOSIS — Z12.11 SCREEN FOR COLON CANCER: ICD-10-CM

## 2025-01-24 DIAGNOSIS — Z00.00 MEDICARE ANNUAL WELLNESS VISIT, SUBSEQUENT: ICD-10-CM

## 2025-01-24 DIAGNOSIS — F17.200 CURRENT EVERY DAY SMOKER: ICD-10-CM

## 2025-01-24 DIAGNOSIS — M25.511 CHRONIC RIGHT SHOULDER PAIN: ICD-10-CM

## 2025-01-24 DIAGNOSIS — G89.29 CHRONIC RIGHT SHOULDER PAIN: ICD-10-CM

## 2025-01-24 DIAGNOSIS — I10 PRIMARY HYPERTENSION: ICD-10-CM

## 2025-01-24 DIAGNOSIS — F32.9 MAJOR DEPRESSIVE DISORDER, REMISSION STATUS UNSPECIFIED, UNSPECIFIED WHETHER RECURRENT: ICD-10-CM

## 2025-01-24 DIAGNOSIS — E78.2 MIXED HYPERLIPIDEMIA: ICD-10-CM

## 2025-01-24 LAB
CREAT UR-MCNC: 58 MG/DL
HBA1C MFR BLD: 6.6 % (ref ?–5.8)
MICROALBUMIN UR-MCNC: <1.2 MG/DL
MICROALBUMIN/CREAT UR: NORMAL MG/G (ref 0–30)
POCT INT CON NEG: NEGATIVE
POCT INT CON POS: POSITIVE

## 2025-01-24 PROCEDURE — 83036 HEMOGLOBIN GLYCOSYLATED A1C: CPT | Performed by: NURSE PRACTITIONER

## 2025-01-24 PROCEDURE — 3078F DIAST BP <80 MM HG: CPT | Performed by: NURSE PRACTITIONER

## 2025-01-24 PROCEDURE — 3074F SYST BP LT 130 MM HG: CPT | Performed by: NURSE PRACTITIONER

## 2025-01-24 PROCEDURE — 99214 OFFICE O/P EST MOD 30 MIN: CPT | Mod: 25 | Performed by: NURSE PRACTITIONER

## 2025-01-24 PROCEDURE — G0439 PPPS, SUBSEQ VISIT: HCPCS | Performed by: NURSE PRACTITIONER

## 2025-01-24 PROCEDURE — 82043 UR ALBUMIN QUANTITATIVE: CPT

## 2025-01-24 PROCEDURE — 82570 ASSAY OF URINE CREATININE: CPT

## 2025-01-24 ASSESSMENT — PATIENT HEALTH QUESTIONNAIRE - PHQ9
CLINICAL INTERPRETATION OF PHQ2 SCORE: 6
SUM OF ALL RESPONSES TO PHQ QUESTIONS 1-9: 18
5. POOR APPETITE OR OVEREATING: 0 - NOT AT ALL

## 2025-01-24 ASSESSMENT — FIBROSIS 4 INDEX: FIB4 SCORE: 5.74

## 2025-01-24 ASSESSMENT — ENCOUNTER SYMPTOMS: GENERAL WELL-BEING: FAIR

## 2025-01-24 ASSESSMENT — ACTIVITIES OF DAILY LIVING (ADL): BATHING_REQUIRES_ASSISTANCE: 0

## 2025-01-24 NOTE — PROGRESS NOTES
No chief complaint on file.      HPI:  History of Present Illness  The patient presents for evaluation of diabetes, right shoulder pain, bilateral knee pain, depression, hypertension, neuropathy, and health maintenance.    He has been managing his diabetes with Mounjaro, which he procures through Medicare. However, due to a recent increase in cost from $ 490 to $ 600 per month, he is currently awaiting a resolution. He has a 2-month supply of Mounjaro left and is waiting for the test results before starting it. He has not yet consulted with an endocrinologist but has had previous consultations with a pharmacist, although he missed his most recent appointment earlier this month. He is not using Dexcom due to financial constraints. He reports persistent neuropathy in his feet, which has not improved with gabapentin treatment. He is not currently on any cholesterol-lowering medication. His hearing and vision remain stable, and he has recently consulted with an ophthalmologist. His current medication regimen includes Lantus 25 units at night, Jardiance 25 mg, and metformin 1000 mg twice daily.    He is scheduled to undergo x-rays of both knees and the right shoulder at urgent care. He has not yet sought orthopedic consultation. He reports experiencing significant muscle cramping in his right shoulder, which he suspects may be due to a failed rotator cuff repair.    He also reports tissue separation in his knees, with the right knee being more severely affected than the left.    He is currently on Wellbutrin 150 mg twice daily for depression, which he reports as being effective. He does not endorse any suicidal ideation and has a sufficient supply of Wellbutrin to last him for the next 30 days.    He is on Coreg for hypertension and is under the care of a cardiologist, with his next appointment scheduled for 12/2025. He has been prescribed aspirin and has discontinued prasugrel.    He has persistent neuropathy in his  feet. He tried gabapentin, but it did not help with the numbness.    He is going to do colon cancer screening this week and send it in. He is leery of influenza and pneumonia vaccines because he gets sick every time he has them. He has already had the Tdap vaccine in the hospital when he got cut.    FAMILY HISTORY  His son has type 1 diabetes.    MEDICATIONS  Current: Monjaro, Lantus, Jardiance, metformin, Wellbutrin, Coreg, aspirin.  Discontinued: Prasugrel, gabapentin.    IMMUNIZATIONS  He has received the Tdap vaccine.     Hal Vilchis is a 49 y.o. here for Medicare Annual Wellness Visit     Patient Active Problem List    Diagnosis Date Noted    Medicare annual wellness visit, subsequent 01/24/2025    Left knee pain 01/24/2025    Chronic right shoulder pain 08/21/2024    Right knee pain 08/21/2024    Major depressive disorder 02/07/2024    Hospital discharge follow-up 12/12/2023    Current every day smoker 12/12/2023    Abnormal CXR 12/12/2023    Coronary artery disease of native artery of native heart with stable angina pectoris (HCC) 12/02/2023    Obstructive sleep apnea 12/02/2023    Primary hypertension 12/02/2023    Type 2 diabetes mellitus without complication, without long-term current use of insulin (HCC) 12/02/2023    Mixed hyperlipidemia 12/02/2023    BMI 32.0-32.9,adult 12/02/2023       Current Outpatient Medications   Medication Sig Dispense Refill    atorvastatin (LIPITOR) 80 MG tablet Take 1 Tablet by mouth every evening. 90 Tablet 3    carvedilol (COREG) 6.25 MG Tab Take 1 Tablet by mouth 2 times a day with meals. 180 Tablet 3    lisinopril (PRINIVIL) 2.5 MG Tab Take 1 Tablet by mouth every day. 90 Tablet 3    buPROPion (WELLBUTRIN XL) 150 MG XL tablet TAKE 2 TABLETS BY MOUTH IN THE MORNING 180 Tablet 3    metFORMIN ER (GLUCOPHAGE XR) 500 MG TABLET SR 24 HR Take 2 tablets by mouth twice daily 360 Tablet 1    Continuous Glucose Sensor (DEXCOM G7 SENSOR) Misc 1 Each every 10 days. This Rx  has been ordered by a pharmacist working under a collaborative practice agreement. 12 Each 4    Tirzepatide 2.5 MG/0.5ML Solution Pen-injector Inject 2.5 mg under the skin every 7 days. 99 mL 9    SEMGLEE, YFGN, 100 UNIT/ML Solution Pen-injector INJECT 25 UNITS SUBCUTANEOUSLY IN THE EVENING 45 mL 1    Empagliflozin (JARDIANCE) 25 MG Tab Take 1 Tablet by mouth every day. 90 Tablet 1    varenicline (CHANTIX) 1 MG tablet Take 1 Tablet by mouth 2 times a day. 180 Tablet 1    aspirin 81 MG EC tablet Take 1 Tablet by mouth every day. 100 Tablet 3    nitroglycerin (NITROSTAT) 0.4 MG SL Tab Place 1 Tablet under the tongue as needed for Chest Pain (for chest pain more than 10 minutes in duration). 25 Tablet 1    Insulin Pen Needle (PEN NEEDLES) 32G X 4 MM Misc USE WITH INSULIN  Each 3     No current facility-administered medications for this visit.          Current supplements as per medication list.     Allergies: Patient has no known allergies.    Current social contact/activities: pt is , active, and independent with ADLs.     He  reports that he has been smoking cigarettes. He started smoking about 43 years ago. He has a 64.6 pack-year smoking history. He has never used smokeless tobacco. He reports that he does not drink alcohol and does not use drugs.  Ready to quit: Not Answered  Counseling given: Not Answered      ROS:    Gait: Uses no assistive device  Ostomy: No  Other tubes: No  Amputations: No  Chronic oxygen use: No  Last eye exam: 1/2025  Wears hearing aids: No   : Denies any urinary leakage during the last 6 months    Screening:    Depression Screening  Little interest or pleasure in doing things?  3 - nearly every day  Feeling down, depressed , or hopeless? 3 - nearly every day  Trouble falling or staying asleep, or sleeping too much?  3 - nearly every day  Feeling tired or having little energy?  3 - nearly every day  Poor appetite or overeating?  0 - not at all  Feeling bad about yourself -  or that you are a failure or have let yourself or your family down? 0 - not at all  Trouble concentrating on things, such as reading the newspaper or watching television? 3 - nearly every day  Moving or speaking so slowly that other people could have noticed.  Or the opposite - being so fidgety or restless that you have been moving around a lot more than usual?  3 - nearly every day  Thoughts that you would be better off dead, or of hurting yourself?  0 - not at all  Patient Health Questionnaire Score: 18    If depressive symptoms identified deferred to follow up visit unless specifically addressed in assessment and plan.    Interpretation of PHQ-9 Total Score   Score Severity   1-4 No Depression   5-9 Mild Depression   10-14 Moderate Depression   15-19 Moderately Severe Depression   20-27 Severe Depression    Screening for Cognitive Impairment  Do you or any of your friends or family members have any concern about your memory? Yes  Three Minute Recall (Leader, Season, Table) 2/3    [place] clock face with all 12 numbers and set the hands to show 10 minutes after 11.  Yes    Cognitive concerns identified deferred for follow up unless specifically addressed in assessment and plan.    Fall Risk Assessment  Has the patient had two or more falls in the last year or any fall with injury in the last year?  No    Safety Assessment  Do you always wear your seatbelt?  Yes  Any changes to home needed to function safely? No  Difficulty hearing.  Yes  Patient counseled about all safety risks that were identified.    Functional Assessment ADLs  Are there any barriers preventing you from cooking for yourself or meeting nutritional needs?  No.    Are there any barriers preventing you from driving safely or obtaining transportation?  No.    Are there any barriers preventing you from using a telephone or calling for help?  No    Are there any barriers preventing you from shopping?  No.    Are there any barriers preventing you from  taking care of your own finances?  No    Are there any barriers preventing you from managing your medications?  No    Are there any barriers preventing you from showering, bathing or dressing yourself? No    Are there any barriers preventing you from doing housework or laundry? No    Are there any barriers preventing you from using the toilet?No    Are you currently engaging in any exercise or physical activity?  No.      Self-Assessment of Health  What is your perception of your health? Fair    Do you sleep more than six hours a night? No    In the past 7 days, how much did pain keep you from doing your normal work? A lot    Do you spend quality time with family or friends (virtually or in person)? No    Do you usually eat a heart healthy diet that constists of a variety of fruits, vegetables, whole grains and fiber? Yes    Do you eat foods high in fat and/or Fast Food more than three times per week? No    How concerned are you that your medical conditions are not being well managed? a little    Are you worried that in the next 2 months, you may not have stable housing that you own, rent, or stay in as part of a household? No        Advance Care Planning  Do you have an Advance Directive, Living Will, Durable Power of , or POLST? No                 Health Maintenance Summary            Ordered - HIV Screening (Once) Ordered on 2/7/2024      No completion history exists for this topic.              Ordered - Hepatitis C Screening (Once) Ordered on 2/7/2024      No completion history exists for this topic.              Overdue - Hepatitis A Vaccine (Hep A) (1 of 2 - Risk 2-dose series) Never done      No completion history exists for this topic.              Overdue - Hepatitis B Vaccine (Hep B) (1 of 3 - 19+ 3-dose series) Never done      No completion history exists for this topic.              Overdue - IMM DTaP/Tdap/Td Vaccine (1 - Tdap) Never done      No completion history exists for this topic.               Ordered - Colorectal Cancer Screening (View Topic Details) Ordered on 1/24/2025      No completion history exists for this topic.              Overdue - Zoster (Shingles) Vaccines (1 of 2) Never done      No completion history exists for this topic.              Ordered - Lung Cancer Screening Shared Decision Making (Once) Ordered on 2/7/2024      No completion history exists for this topic.              Overdue - COVID-19 Vaccine (1 - 2024-25 season) Never done      No completion history exists for this topic.              Ordered - Fasting Lipid Profile (Yearly) Ordered on 8/21/2024 12/03/2023  Lipid Profile (Tomorrow AM)              Ordered - SERUM CREATININE (Yearly) Ordered on 8/21/2024 12/03/2023  Comp Metabolic Panel (CMP)    12/02/2023  Complete Metabolic Panel (CMP)              Ordered - Diabetes: Urine Protein Screening (Yearly) Ordered on 1/24/2025 02/07/2024  MICROALBUMIN CREAT RATIO URINE              Postponed - Influenza Vaccine (1) Postponed until 1/24/2026      No completion history exists for this topic.              Postponed - Pneumococcal Vaccine: 0-64 Years (1 of 2 - PCV) Postponed until 1/24/2026      No completion history exists for this topic.              A1c Screening (Every 6 Months) Tentatively due on 7/24/2025 01/24/2025  POCT  A1C    08/21/2024  POCT  A1C    05/07/2024  POCT  A1C    02/07/2024  POCT  A1C    12/02/2023  HEMOGLOBIN A1C    Only the first 5 history entries have been loaded, but more history exists.              Diabetes: Retinopathy Screening (Yearly) Next due on 1/9/2026 01/09/2025  RETINAL SCREENING RESULTS              Annual Wellness Visit (Yearly) Next due on 1/24/2026 01/24/2025  Visit Dx: Medicare annual wellness visit, subsequent    01/24/2025  Prob Dx: Medicare annual wellness visit, subsequent              Diabetes: Monofilament / LE Exam (Yearly) Tentatively due on 1/24/2026 01/24/2025  Diabetic Monofilament LE Exam  "   02/07/2024  [employer]: WORKFLOW - DIABETES - DIABETIC FOOT EXAM PERFORMED              HPV Vaccines (Series Information) Aged Out      No completion history exists for this topic.              Polio Vaccine (Inactivated Polio) (Series Information) Aged Out      No completion history exists for this topic.              Meningococcal Immunization (Series Information) Aged Out      No completion history exists for this topic.                    Patient Care Team:  JC Miles as PCP - General (Nurse Practitioner Family)      Social History     Tobacco Use    Smoking status: Some Days     Current packs/day: 1.50     Average packs/day: 1.5 packs/day for 43.1 years (64.6 ttl pk-yrs)     Types: Cigarettes     Start date: 1982    Smokeless tobacco: Never   Vaping Use    Vaping status: Never Used   Substance Use Topics    Alcohol use: Never    Drug use: Never     Family History   Problem Relation Age of Onset    Coronary artery disease Father     Heart Attack Father     Other Father         Bypass surgery    Coronary artery disease Paternal Grandfather     Heart Attack Paternal Grandfather     Other Paternal Grandfather         Bypass surgery     He  has no past medical history on file.   History reviewed. No pertinent surgical history.    Exam:   /76 (BP Location: Right arm, Patient Position: Sitting, BP Cuff Size: Adult)   Pulse 80   Temp 36.8 °C (98.2 °F)   Resp 18   Ht 1.854 m (6' 1\")   Wt 114 kg (250 lb 6.4 oz)   SpO2 96%  Body mass index is 33.04 kg/m².    Hearing good.    Dentition fair  Alert, oriented in no acute distress.  Eye contact is good, speech goal directed, affect calm    Assessment and Plan. The following treatment and monitoring plan is recommended:    1. Medicare annual wellness visit, subsequent    2. Type 2 diabetes mellitus without complication, without long-term current use of insulin ([facility])  - Referral to Endocrinology  - POCT  A1C  - Microalbumin Creat Ratio Urine " (Clinic Collect); Future  - Diabetic Monofilament LE Exam    3. Chronic right shoulder pain  - Referral to Orthopedics    4. Chronic pain of right knee  - Referral to Orthopedics    5. Left knee pain, unspecified chronicity  - Referral to Orthopedics    6. Major depressive disorder, remission status unspecified, unspecified whether recurrent    7. Current every day smoker    8. Primary hypertension    9. Mixed hyperlipidemia    10. Screen for colon cancer  - Cologuard® colon cancer screening    Assessment & Plan  1. Diabetes mellitus.  Chronic and stable condition.  His A1c today is 6.6.  His last recorded A1c level was 8 in August 2024. He is currently on Lantus 25 units at night, Jardiance 25 mg, and Metformin 1000 mg twice a day. He has 2 months' worth of Monjaro but is waiting for insurance corrections. A referral to endocrinology will be initiated for further management of his diabetes. An A1c test will be conducted today.  Monofilament testing with a 10 gram force: sensation intact: intact bilaterally  Visual Inspection: Feet without maceration, ulcers, fissures.  Pedal pulses: intact bilaterally     2. Right shoulder pain.  Chronic and stable condition.  He reports cramping and pain in the right shoulder, suspecting a failed rotator cuff repair. A referral to orthopedics will be made for further evaluation and management. He is advised to avoid any imaging until after the orthopedic consultation.    3. Bilateral knee pain.  Chronic and stable condition.  He reports tissue damage in both knees, with the right knee being worse. A referral to orthopedics will be made for further evaluation and management. He is advised to avoid any imaging until after the orthopedic consultation.    4. Depression.  Chronic and able condition.  He is currently on Wellbutrin 150 mg twice a day and reports improvement but still experiences some depressive symptoms due to seasonal affective disorder. He is not suicidal. No refill is  needed at this time as he has a 30-day supply left.    5. Hypertension.  Chronic and able condition.  He is on Coreg 6.25 mg twice daily as well as lisinopril 2.5 mg daily, aspirin 81 mg daily. He has seen cardiology and will follow up with them in December 2025.    6. Neuropathy.  Chronic and stable condition.  He reports persistent neuropathy in his feet. Previous treatment with gabapentin was ineffective.    7. Health maintenance.  8.  Medicare annual exam.  He will complete a colon cancer screening test this week. He has declined the influenza and pneumonia vaccines due to past adverse reactions. He has already received the Tdap vaccine.    PROCEDURE  The patient underwent a rotator cuff repair procedure in the past.       Services suggested: No services needed at this time  Health Care Screening: Age-appropriate preventive services recommended by USPTF and ACIP covered by Medicare were discussed today. Services ordered if indicated and agreed upon by the patient.  Referrals offered: Community-based lifestyle interventions to reduce health risks and promote self-management and wellness, fall prevention, nutrition, physical activity, tobacco-use cessation, weight loss, and mental health services as per orders if indicated.    Discussion today about general wellness and lifestyle habits:    Prevent falls and reduce trip hazards; Cautioned about securing or removing rugs.  Have a working fire alarm and carbon monoxide detector;   Engage in regular physical activity and social activities     Follow-up: No follow-ups on file.

## 2025-01-24 NOTE — PROGRESS NOTES
No chief complaint on file.      HPI:  Hal Vilchis is a 49 y.o. here for Medicare Annual Wellness Visit     Patient Active Problem List    Diagnosis Date Noted    Chronic right shoulder pain 08/21/2024    Right knee pain 08/21/2024    Major depressive disorder 02/07/2024    Hospital discharge follow-up 12/12/2023    Current every day smoker 12/12/2023    Abnormal CXR 12/12/2023    Coronary artery disease of native artery of native heart with stable angina pectoris (HCC) 12/02/2023    Obstructive sleep apnea 12/02/2023    Primary hypertension 12/02/2023    Type 2 diabetes mellitus without complication, without long-term current use of insulin (HCC) 12/02/2023    Mixed hyperlipidemia 12/02/2023    BMI 32.0-32.9,adult 12/02/2023       Current Outpatient Medications   Medication Sig Dispense Refill    atorvastatin (LIPITOR) 80 MG tablet Take 1 Tablet by mouth every evening. 90 Tablet 3    carvedilol (COREG) 6.25 MG Tab Take 1 Tablet by mouth 2 times a day with meals. 180 Tablet 3    lisinopril (PRINIVIL) 2.5 MG Tab Take 1 Tablet by mouth every day. 90 Tablet 3    buPROPion (WELLBUTRIN XL) 150 MG XL tablet TAKE 2 TABLETS BY MOUTH IN THE MORNING 180 Tablet 3    metFORMIN ER (GLUCOPHAGE XR) 500 MG TABLET SR 24 HR Take 2 tablets by mouth twice daily 360 Tablet 1    Continuous Glucose Sensor (DEXCOM G7 SENSOR) Misc 1 Each every 10 days. This Rx has been ordered by a pharmacist working under a collaborative practice agreement. 12 Each 4    Tirzepatide 2.5 MG/0.5ML Solution Pen-injector Inject 2.5 mg under the skin every 7 days. 99 mL 9    SEMGLEE, YFGN, 100 UNIT/ML Solution Pen-injector INJECT 25 UNITS SUBCUTANEOUSLY IN THE EVENING 45 mL 1    Empagliflozin (JARDIANCE) 25 MG Tab Take 1 Tablet by mouth every day. 90 Tablet 1    varenicline (CHANTIX) 1 MG tablet Take 1 Tablet by mouth 2 times a day. 180 Tablet 1    aspirin 81 MG EC tablet Take 1 Tablet by mouth every day. 100 Tablet 3    nitroglycerin (NITROSTAT) 0.4  MG SL Tab Place 1 Tablet under the tongue as needed for Chest Pain (for chest pain more than 10 minutes in duration). 25 Tablet 1    Insulin Pen Needle (PEN NEEDLES) 32G X 4 MM Misc USE WITH INSULIN  Each 3     No current facility-administered medications for this visit.          Current supplements as per medication list.     Allergies: Patient has no known allergies.    Current social contact/activities:      He  reports that he has been smoking cigarettes. He started smoking about 43 years ago. He has a 64.6 pack-year smoking history. He has never used smokeless tobacco. He reports that he does not drink alcohol and does not use drugs.  Ready to quit: Not Answered  Counseling given: Not Answered      ROS:    Gait: Uses no assistive device  Ostomy: No  Other tubes: No  Amputations: No  Chronic oxygen use: No  Last eye exam: 2025  Wears hearing aids: No   : Denies any urinary leakage during the last 6 months    Screening:    Depression Screening  Little interest or pleasure in doing things?     Feeling down, depressed , or hopeless?    Trouble falling or staying asleep, or sleeping too much?     Feeling tired or having little energy?     Poor appetite or overeating?     Feeling bad about yourself - or that you are a failure or have let yourself or your family down?    Trouble concentrating on things, such as reading the newspaper or watching television?    Moving or speaking so slowly that other people could have noticed.  Or the opposite - being so fidgety or restless that you have been moving around a lot more than usual?     Thoughts that you would be better off dead, or of hurting yourself?     Patient Health Questionnaire Score:      If depressive symptoms identified deferred to follow up visit unless specifically addressed in assessment and plan.    Interpretation of PHQ-9 Total Score   Score Severity   1-4 No Depression   5-9 Mild Depression   10-14 Moderate Depression   15-19 Moderately Severe  Depression   20-27 Severe Depression    Screening for Cognitive Impairment  Do you or any of your friends or family members have any concern about your memory?    Three Minute Recall (Leader, Season, Table)  /3    Fr[place] clock face with all 12 numbers and set the hands to show 10 minutes after 11.       Cognitive concerns identified deferred for follow up unless specifically addressed in assessment and plan.    Fall Risk Assessment  Has the patient had two or more falls in the last year or any fall with injury in the last year?       Safety Assessment  Do you always wear your seatbelt?     Any changes to home needed to function safely?    Difficulty hearing.     Patient counseled about all safety risks that were identified.    Functional Assessment ADLs  Are there any barriers preventing you from cooking for yourself or meeting nutritional needs?   .    Are there any barriers preventing you from driving safely or obtaining transportation?   .    Are there any barriers preventing you from using a telephone or calling for help?       Are there any barriers preventing you from shopping?   .    Are there any barriers preventing you from taking care of your own finances?       Are there any barriers preventing you from managing your medications?       Are there any barriers preventing you from showering, bathing or dressing yourself?      Are there any barriers preventing you from doing housework or laundry?      Are there any barriers preventing you from using the toilet?     Are you currently engaging in any exercise or physical activity?   .      Self-Assessment of Health  What is your perception of your health?      Do you sleep more than six hours a night?      In the past 7 days, how much did pain keep you from doing your normal work?      Do you spend quality time with family or friends (virtually or in person)?      Do you usually eat a heart healthy diet that constists of a variety of fruits, vegetables, whole  grains and fiber?      Do you eat foods high in fat and/or Fast Food more than three times per week?      How concerned are you that your medical conditions are not being well managed?      Are you worried that in the next 2 months, you may not have stable housing that you own, rent, or stay in as part of a household?          Advance Care Planning  Do you have an Advance Directive, Living Will, Durable Power of , or POLST?                   Health Maintenance Summary            Ordered - HIV Screening (Once) Ordered on 2/7/2024      No completion history exists for this topic.              Overdue - Annual Wellness Visit (Yearly) Never done      No completion history exists for this topic.              Ordered - Hepatitis C Screening (Once) Ordered on 2/7/2024      No completion history exists for this topic.              Overdue - Pneumococcal Vaccine: 0-64 Years (1 of 2 - PCV) Never done      No completion history exists for this topic.              Overdue - Hepatitis A Vaccine (Hep A) (1 of 2 - Risk 2-dose series) Never done      No completion history exists for this topic.              Overdue - Hepatitis B Vaccine (Hep B) (1 of 3 - 19+ 3-dose series) Never done      No completion history exists for this topic.              Overdue - IMM DTaP/Tdap/Td Vaccine (1 - Tdap) Never done      No completion history exists for this topic.              Ordered - Colorectal Cancer Screening (View Topic Details) Ordered on 8/21/2024      No completion history exists for this topic.              Overdue - Zoster (Shingles) Vaccines (1 of 2) Never done      No completion history exists for this topic.              Ordered - Lung Cancer Screening Shared Decision Making (Once) Ordered on 2/7/2024      No completion history exists for this topic.              Overdue - Influenza Vaccine (1) Never done      No completion history exists for this topic.              Overdue - COVID-19 Vaccine (1 - 2024-25 season) Never  done      No completion history exists for this topic.              Ordered - Fasting Lipid Profile (Yearly) Ordered on 8/21/2024 12/03/2023  Lipid Profile (Tomorrow AM)              Ordered - SERUM CREATININE (Yearly) Ordered on 8/21/2024 12/03/2023  Comp Metabolic Panel (CMP)    12/02/2023  Complete Metabolic Panel (CMP)              Diabetes: Urine Protein Screening (Yearly) Due soon on 2/7/2025 02/07/2024  MICROALBUMIN CREAT RATIO URINE              Diabetes: Monofilament / LE Exam (Yearly) Due soon on 2/7/2025 02/07/2024  Smart[employer]: WORKFLOW - DIABETES - DIABETIC FOOT EXAM PERFORMED              A1c Screening (Every 6 Months) Due soon on 2/21/2025 08/21/2024  POCT  A1C    05/07/2024  POCT  A1C    02/07/2024  POCT  A1C    12/02/2023  HEMOGLOBIN A1C              Diabetes: Retinopathy Screening (Yearly) Next due on 1/9/2026 01/09/2025  RETINAL SCREENING RESULTS              HPV Vaccines (Series Information) Aged Out      No completion history exists for this topic.              Polio Vaccine (Inactivated Polio) (Series Information) Aged Out      No completion history exists for this topic.              Meningococcal Immunization (Series Information) Aged Out      No completion history exists for this topic.                    Patient Care Team:  JC Miles as PCP - General (Nurse Practitioner Family)      Social History     Tobacco Use    Smoking status: Some Days     Current packs/day: 1.50     Average packs/day: 1.5 packs/day for 43.1 years (64.6 ttl pk-yrs)     Types: Cigarettes     Start date: 1982    Smokeless tobacco: Never   Vaping Use    Vaping status: Never Used   Substance Use Topics    Alcohol use: Never    Drug use: Never     Family History   Problem Relation Age of Onset    Coronary artery disease Father     Heart Attack Father     Other Father         Bypass surgery    Coronary artery disease Paternal Grandfather     Heart Attack Paternal Grandfather      "Other Paternal Grandfather         Bypass surgery     He  has no past medical history on file.   History reviewed. No pertinent surgical history.    Exam:   /76 (BP Location: Right arm, Patient Position: Sitting, BP Cuff Size: Adult)   Pulse 80   Temp 36.8 °C (98.2 °F)   Resp 18   Ht 1.854 m (6' 1\")   Wt 114 kg (250 lb 6.4 oz)   SpO2 96%  Body mass index is 33.04 kg/m².    Hearing good.    Dentition fair  Alert, oriented in no acute distress.  Eye contact is good, speech goal directed, affect calm    Assessment and Plan. The following treatment and monitoring plan is recommended:    There are no diagnoses linked to this encounter.    Services suggested: No services needed at this time  Health Care Screening: Age-appropriate preventive services recommended by USPTF and ACIP covered by Medicare were discussed today. Services ordered if indicated and agreed upon by the patient.  Referrals offered: Community-based lifestyle interventions to reduce health risks and promote self-management and wellness, fall prevention, nutrition, physical activity, tobacco-use cessation, weight loss, and mental health services as per orders if indicated.    Discussion today about general wellness and lifestyle habits:    Prevent falls and reduce trip hazards; Cautioned about securing or removing rugs.  Have a working fire alarm and carbon monoxide detector;   Engage in regular physical activity and social activities     Follow-up: No follow-ups on file.   "

## 2025-01-30 ENCOUNTER — PATIENT MESSAGE (OUTPATIENT)
Dept: CARDIOLOGY | Facility: MEDICAL CENTER | Age: 50
End: 2025-01-30
Payer: MEDICARE

## 2025-01-30 DIAGNOSIS — E78.2 MIXED HYPERLIPIDEMIA: ICD-10-CM

## 2025-01-30 RX ORDER — FENOFIBRATE 160 MG/1
160 TABLET ORAL DAILY
Qty: 90 TABLET | Refills: 1 | Status: SHIPPED | OUTPATIENT
Start: 2025-01-30

## 2025-01-30 NOTE — TELEPHONE ENCOUNTER
MT- Please advise on patient message below, patient updated labs are scanned into media on 1/6/25. Thank you.

## 2025-01-30 NOTE — TELEPHONE ENCOUNTER
Triglycerides were 235. Unfortunately one medication may not be sufficient for full coverage. Ad fenofibrate 160 daily and repeat in one month. Make sure its fasting

## 2025-02-11 DIAGNOSIS — E11.9 TYPE 2 DIABETES MELLITUS WITHOUT COMPLICATION, WITHOUT LONG-TERM CURRENT USE OF INSULIN (HCC): ICD-10-CM

## 2025-02-11 RX ORDER — EMPAGLIFLOZIN 25 MG/1
1 TABLET, FILM COATED ORAL DAILY
Qty: 90 TABLET | Refills: 0 | Status: SHIPPED | OUTPATIENT
Start: 2025-02-11

## 2025-02-16 DIAGNOSIS — I24.9 ACUTE CORONARY SYNDROMES (HCC): ICD-10-CM

## 2025-02-16 DIAGNOSIS — F17.200 CURRENT EVERY DAY SMOKER: ICD-10-CM

## 2025-02-18 RX ORDER — VARENICLINE TARTRATE 1 MG/1
1 TABLET, FILM COATED ORAL 2 TIMES DAILY
Qty: 180 TABLET | Refills: 0 | Status: SHIPPED | OUTPATIENT
Start: 2025-02-18

## 2025-02-18 RX ORDER — PRASUGREL 10 MG/1
10 TABLET, FILM COATED ORAL DAILY
Qty: 90 TABLET | Refills: 0 | OUTPATIENT
Start: 2025-02-18

## 2025-07-24 ENCOUNTER — OFFICE VISIT (OUTPATIENT)
Dept: MEDICAL GROUP | Facility: MEDICAL CENTER | Age: 50
End: 2025-07-24
Payer: MEDICARE

## 2025-07-24 VITALS
SYSTOLIC BLOOD PRESSURE: 114 MMHG | DIASTOLIC BLOOD PRESSURE: 72 MMHG | WEIGHT: 257.72 LBS | BODY MASS INDEX: 34.16 KG/M2 | HEART RATE: 79 BPM | HEIGHT: 73 IN | TEMPERATURE: 98.6 F | OXYGEN SATURATION: 97 %

## 2025-07-24 DIAGNOSIS — E11.9 TYPE 2 DIABETES MELLITUS WITHOUT COMPLICATION, WITHOUT LONG-TERM CURRENT USE OF INSULIN (HCC): ICD-10-CM

## 2025-07-24 DIAGNOSIS — E78.2 MIXED HYPERLIPIDEMIA: Primary | ICD-10-CM

## 2025-07-24 DIAGNOSIS — F17.200 CURRENT EVERY DAY SMOKER: ICD-10-CM

## 2025-07-24 DIAGNOSIS — Z11.59 NEED FOR HEPATITIS C SCREENING TEST: ICD-10-CM

## 2025-07-24 DIAGNOSIS — Z12.11 SCREEN FOR COLON CANCER: ICD-10-CM

## 2025-07-24 DIAGNOSIS — Z23 NEED FOR VACCINATION: ICD-10-CM

## 2025-07-24 DIAGNOSIS — I10 PRIMARY HYPERTENSION: ICD-10-CM

## 2025-07-24 DIAGNOSIS — Z12.5 SCREENING FOR PROSTATE CANCER: ICD-10-CM

## 2025-07-24 DIAGNOSIS — Z11.4 SCREENING FOR HIV (HUMAN IMMUNODEFICIENCY VIRUS): ICD-10-CM

## 2025-07-24 DIAGNOSIS — Z12.83 SCREENING FOR SKIN CANCER: ICD-10-CM

## 2025-07-24 LAB
HBA1C MFR BLD: 7 % (ref ?–5.8)
POCT INT CON NEG: NEGATIVE
POCT INT CON POS: POSITIVE

## 2025-07-24 PROCEDURE — 99214 OFFICE O/P EST MOD 30 MIN: CPT | Mod: 25 | Performed by: NURSE PRACTITIONER

## 2025-07-24 PROCEDURE — 90472 IMMUNIZATION ADMIN EACH ADD: CPT | Performed by: NURSE PRACTITIONER

## 2025-07-24 PROCEDURE — 3078F DIAST BP <80 MM HG: CPT | Performed by: NURSE PRACTITIONER

## 2025-07-24 PROCEDURE — 83036 HEMOGLOBIN GLYCOSYLATED A1C: CPT | Performed by: NURSE PRACTITIONER

## 2025-07-24 PROCEDURE — 90715 TDAP VACCINE 7 YRS/> IM: CPT | Performed by: NURSE PRACTITIONER

## 2025-07-24 PROCEDURE — 3074F SYST BP LT 130 MM HG: CPT | Performed by: NURSE PRACTITIONER

## 2025-07-24 PROCEDURE — G0009 ADMIN PNEUMOCOCCAL VACCINE: HCPCS | Performed by: NURSE PRACTITIONER

## 2025-07-24 PROCEDURE — 90677 PCV20 VACCINE IM: CPT | Performed by: NURSE PRACTITIONER

## 2025-07-24 ASSESSMENT — FIBROSIS 4 INDEX: FIB4 SCORE: 5.86

## 2025-07-24 ASSESSMENT — ENCOUNTER SYMPTOMS
CHILLS: 0
FEVER: 0
PALPITATIONS: 0

## 2025-07-24 NOTE — PROGRESS NOTES
Patient agreed to use of JOHN: yes  Chief Complaint   Patient presents with    Follow-Up     6 mo fv       HISTORY OF PRESENT ILLNESS: Patient is a pleasant 50 y.o. male, established patient who presents today to discuss medical problems as listed below:    Assessment/Plan:    Hal was seen today for follow-up.    Diagnoses and all orders for this visit:    Mixed hyperlipidemia  -     Comp Metabolic Panel; Future  -     Lipid Profile; Future    Type 2 diabetes mellitus without complication, without long-term current use of insulin (HCC)  -     TSH WITH REFLEX TO FT4; Future  -     POCT A1C  -     Referral to Pharmacotherapy Service    Primary hypertension  -     CBC WITHOUT DIFFERENTIAL; Future    Screening for prostate cancer  -     PROSTATE SPECIFIC AG SCREENING; Future    Current every day smoker    Need for vaccination  -     Pneumococcal Conjugate Vaccine 20-Valent (6 wks+)  -     Tdap Vaccine =>6YO IM    Screening for HIV (human immunodeficiency virus)  -     HIV AG/AB COMBO ASSAY SCREENING; Future    Need for hepatitis C screening test  -     HEP C VIRUS ANTIBODY; Future    Screen for colon cancer  -     Cologuard® colon cancer screening  -     Referral to Dermatology    Screening for skin cancer              Assessment & Plan  1.  Type II diabetes mellitus  Chronic and stable condition.  A1c is at 7 off of GLP due to insurance issues  - Stopped taking Jardiance due to cost; currently on metformin and Lantus  - Last blood work done at previous appointment; no lab tests since 2023  - Point-of-care A1c test to be conducted today  - Advised to see pharmacist for further management  - Fasting blood work to be ordered to check kidney function before new prescriptions    2. Hyperlipidemia  Chronic and stable condition.  Continue atorvastatin 80 mg  - Currently on atorvastatin and fenofibrate as prescribed by cardiologist  - No changes to current medication regimen necessary  - Records indicate stable management of  hyperlipidemia  - Continue current medications without modification    3. Smoking cessation  - Could not afford Chantix; currently smoking 1/4 pack per day  - Advised to seek help to quit smoking and make an appointment to get Chantix prescription again  - Counseling provided on importance of smoking cessation  - Referral to smoking cessation support services recommended    4. Health maintenance  - Due for pneumonia and Tdap vaccines; will be administered today  - Cologuard test to be ordered as requested  - Review of vaccination history indicates need for updates  - Vaccines and Cologuard test to be administered today    5.  Primary hypertension   Chronic and stable condition.  Currently on carvedilol and lisinopril, following with cardiology, continue current regimen      Follow-up  - Patient to follow up in 3 months    History of Present Illness  The patient presents for evaluation of diabetes, hyperlipidemia, and smoking cessation.    He reports satisfactory control of his diabetes but had to discontinue Jardiance due to financial constraints, as it costs $800 per month. He is currently managing his diabetes with metformin and Lantus. His last blood work was conducted during his previous appointment. He has not seen his pharmacist since his last visit here and acknowledges the need to follow up. He is on Medicare. He lost 40 pounds in 2 months while on Mounjaro but has since regained the weight. He continues to walk for exercise. He takes 2 metformin tablets daily and administers 20 units of Lantus at night.    He also takes cholesterol medication, specifically fenofibrate and atorvastatin, as prescribed by his cardiologist.    He has not received a pneumonia vaccine in a long time and is open to receiving one today. He frequently contracts pneumonia. He is also due for a tetanus vaccine. He requests a refill for Cologuard, noting that the current box is still valid until the end of the year.    He continues to  "smoke, consuming a quarter pack per day, as he could not afford Chantix, which costs a couple of hundred dollars per month.    Tobacco: He continues to smoke, consuming a quarter pack per day.  Living Condition: He reports losing his house and is currently in a program.    Health Maintenance:  COMPLETED     Allergies, past medical history, past surgical history, family history, social history reviewed and updated.    Review of Systems   Constitutional:  Negative for chills, fever and malaise/fatigue.   Cardiovascular:  Negative for chest pain, palpitations and leg swelling.        Exam:    /72   Pulse 79   Temp 37 °C (98.6 °F) (Temporal)   Ht 1.854 m (6' 1\")   Wt 117 kg (257 lb 11.5 oz)   SpO2 97%   BMI 34.00 kg/m²  Body mass index is 34 kg/m².    Physical Exam  Constitutional:       General: He is not in acute distress.     Appearance: He is not ill-appearing.   HENT:      Head: Normocephalic and atraumatic.      Nose: Nose normal.   Eyes:      General:         Right eye: No discharge.         Left eye: No discharge.   Cardiovascular:      Rate and Rhythm: Normal rate.      Pulses: Normal pulses.      Heart sounds: Normal heart sounds. No murmur heard.  Pulmonary:      Effort: Pulmonary effort is normal. No respiratory distress.      Breath sounds: Normal breath sounds. No stridor. No wheezing or rales.   Skin:     Findings: No rash.   Neurological:      General: No focal deficit present.      Mental Status: He is alert. Mental status is at baseline.   Psychiatric:         Mood and Affect: Mood normal.         Behavior: Behavior normal.          Results      Discussed with patient possible alternative diagnoses, patient is to take all medications as prescribed.      If symptoms persist FU w/PCP, if symptoms worsen go to emergency room.      If experiencing any side effects from prescribed medications report to the office immediately or go to emergency room.     Reviewed indication, dosage, usage and " potential adverse effects of prescribed medications.      Reviewed risks and benefits of treatment plan. Patient verbalizes understanding of all instruction and verbally agrees to plan.     Discussed plan with the patient, and patient agrees to the above.      I personally reviewed prior external notes and test results pertinent to today's visit.      No follow-ups on file. 3 mos

## 2025-07-24 NOTE — Clinical Note
REFERRAL APPROVAL NOTICE         Sent on July 24, 2025                   Hal Vilchis  [address] [address]                   Dear Mr. Vilchis,    After a careful review of the medical information and benefit coverage, [facility] has processed your referral. See below for additional details.    If applicable, you must be actively enrolled with your insurance for coverage of the authorized service. If you have any questions regarding your coverage, please contact your insurance directly.    REFERRAL INFORMATION   Referral #:  [id number]  Referred-To Department    Referred-By Provider:  Dermatology    JC Miles   [facility]      [address]  [address]  [place] [address]  [phone] [address]  [place] [id number] [phone]    Referral Start Date:  07/24/2025  Referral End Date:   07/24/2026             SCHEDULING  If you do not already have an appointment, please call [phone] to make an appointment.     MORE INFORMATION  If you do not already have a [employer] account, sign up at: [email or web address]  You can access your medical information, make appointments, see lab results, billing information, and more.  If you have questions regarding this referral, please contact  the [facility] Referrals department at:             [phone]. Monday - Friday 8:00AM - 5:00PM.     Sincerely,    [facility]

## 2025-07-25 ENCOUNTER — TELEPHONE (OUTPATIENT)
Dept: VASCULAR LAB | Facility: MEDICAL CENTER | Age: 50
End: 2025-07-25
Payer: MEDICARE

## 2025-07-25 NOTE — TELEPHONE ENCOUNTER
[facility]     Received smoking cessation referral from XENIA Cedillo on 7/24/25.     Called patient to schedule an appt to establish care. No answer. LVM.    1st call.     Insurance: Medicare  PCP: [facility]  Locations to be seen: Any     If no response by 8/24/25 (1 month from referral date) OR 2 no shows/cancellations, will remove from referral list and send FYI to referring provider    Arnold Gee, PharmD, BCACP  [facility]  Phone: [phone], Fax [phone]

## 2025-07-29 DIAGNOSIS — E78.2 MIXED HYPERLIPIDEMIA: ICD-10-CM

## 2025-07-29 RX ORDER — FENOFIBRATE 160 MG/1
160 TABLET ORAL DAILY
Qty: 90 TABLET | Refills: 0 | Status: SHIPPED | OUTPATIENT
Start: 2025-07-29

## 2025-07-29 NOTE — Clinical Note
REFERRAL APPROVAL NOTICE         Sent on July 29, 2025                   Hal Vilchis  [address] [address]                   Dear Mr. Vilchis,    After a careful review of the medical information and benefit coverage, [facility] has processed your referral. See below for additional details.    If applicable, you must be actively enrolled with your insurance for coverage of the authorized service. If you have any questions regarding your coverage, please contact your insurance directly.    REFERRAL INFORMATION   Referral #:  [id number]  Referred-To Department    Referred-By Provider:  Smoking Cessation Program    JC Miles   [facility]      [address]  [address]  [place] [id number] [phone] [address] [facility] [address] [phone]    Referral Start Date:  07/24/2025  Referral End Date:   07/24/2026             SCHEDULING  If you do not already have an appointment, please call [phone] to make an appointment.     MORE INFORMATION  If you do not already have a [employer] account, sign up at: [email or web address]  You can access your medical information, make appointments, see lab results, billing information, and more.  If you have questions regarding this referral, please contact  the [facility] Referrals department at:             [phone]. Monday - Friday 8:00AM - 5:00PM.     Sincerely,    [facility]

## 2025-07-29 NOTE — TELEPHONE ENCOUNTER
Is the patient due for a refill? Yes    Was the patient seen the last 15 months? Yes    Date of last office visit: 12/4/24    Does the patient have an upcoming appointment?  Yes   If yes, When? 12/4/25    Provider to refill:MT    Does the patient have long term Plus and need 100-day supply? (This applies to ALL medications) Patient does not have SCP

## 2025-08-01 ENCOUNTER — TELEPHONE (OUTPATIENT)
Dept: VASCULAR LAB | Facility: MEDICAL CENTER | Age: 50
End: 2025-08-01
Payer: MEDICARE

## 2025-08-04 LAB
CHOLEST SERPL-MCNC: 133 MG/DL
HDLC SERPL-MCNC: 30 MG/DL
LDLC SERPL CALC-MCNC: 83 MG/DL
TRIGL SERPL-MCNC: 114 MG/DL

## 2025-08-05 ENCOUNTER — RESULTS FOLLOW-UP (OUTPATIENT)
Dept: CARDIOLOGY | Facility: MEDICAL CENTER | Age: 50
End: 2025-08-05
Payer: MEDICARE

## 2025-08-05 DIAGNOSIS — E78.2 MIXED HYPERLIPIDEMIA: ICD-10-CM

## 2025-08-07 ENCOUNTER — TELEPHONE (OUTPATIENT)
Dept: HEALTH INFORMATION MANAGEMENT | Facility: OTHER | Age: 50
End: 2025-08-07
Payer: MEDICARE

## 2025-08-15 ENCOUNTER — PATIENT MESSAGE (OUTPATIENT)
Dept: VASCULAR LAB | Facility: MEDICAL CENTER | Age: 50
End: 2025-08-15
Payer: MEDICARE

## 2025-08-17 LAB — NONINV COLON CA DNA+OCC BLD SCRN STL QL: NEGATIVE
